# Patient Record
Sex: MALE | Race: WHITE | URBAN - METROPOLITAN AREA
[De-identification: names, ages, dates, MRNs, and addresses within clinical notes are randomized per-mention and may not be internally consistent; named-entity substitution may affect disease eponyms.]

---

## 2023-05-15 ENCOUNTER — TELEPHONE (OUTPATIENT)
Dept: CARDIOLOGY CLINIC | Age: 58
End: 2023-05-15

## 2023-05-15 NOTE — TELEPHONE ENCOUNTER
Patient was referred to the [address] [place] [address] location with Dr. Teri Turcios. Patient has been scheduled for 06/22 at 7:45am (am/pm). Patient verbalized understanding of appointment instructions. Call complete.      NOTE: Per Pt he will be dropping off his medical records for [facility]

## 2023-06-20 PROBLEM — I48.92 ATRIAL FLUTTER (HCC): Status: ACTIVE | Noted: 2019-03-11

## 2023-06-20 PROBLEM — R55 SYNCOPE: Status: ACTIVE | Noted: 2019-03-11

## 2023-06-20 NOTE — PROGRESS NOTES
drinks as this may exacerbated ectopy and arrhythmia. - The patient is counseled to get regular exercise 3-5 times per week to control cardiovascular risk factors. All questions and concerns were addressed to the patient/family. Alternatives to my treatment were discussed. I have discussed the above stated plan and the patient verbalized understanding and agreed with the plan. Scribe attestation: This note was scribed in the presence of Varun Corona MD by Jalyn Woodruff RN    I, Dr. Varun Corona personally performed the services described in this documentation as scribed by RN in my presence, and it is both accurate and complete. NOTE: This report was transcribed using voice recognition software. Every effort was made to ensure accuracy, however, inadvertent computerized transcription errors may be present.      Varun Corona MD, [facility], [address] [place] [address]   Office: [phone]  Fax: [phone]

## 2023-06-22 ENCOUNTER — HOSPITAL ENCOUNTER (OUTPATIENT)
Age: 58
Discharge: HOME | End: 2023-06-22
Payer: MEDICAID

## 2023-06-22 ENCOUNTER — OFFICE VISIT (OUTPATIENT)
Dept: CARDIOLOGY CLINIC | Age: 58
End: 2023-06-22
Payer: MEDICAID

## 2023-06-22 VITALS
DIASTOLIC BLOOD PRESSURE: 120 MMHG | OXYGEN SATURATION: 96 % | SYSTOLIC BLOOD PRESSURE: 162 MMHG | WEIGHT: 180 LBS | BODY MASS INDEX: 23.1 KG/M2 | HEART RATE: 41 BPM | HEIGHT: 74 IN

## 2023-06-22 DIAGNOSIS — Z87.891 HX OF SMOKING: ICD-10-CM

## 2023-06-22 DIAGNOSIS — R07.9 CHEST PAIN, UNSPECIFIED TYPE: ICD-10-CM

## 2023-06-22 DIAGNOSIS — R55 SYNCOPE, UNSPECIFIED SYNCOPE TYPE: ICD-10-CM

## 2023-06-22 DIAGNOSIS — R06.02 SOB (SHORTNESS OF BREATH): ICD-10-CM

## 2023-06-22 DIAGNOSIS — L81.9 DISCOLORATION OF SKIN OF LOWER LEG: ICD-10-CM

## 2023-06-22 DIAGNOSIS — I48.92 ATRIAL FLUTTER, UNSPECIFIED TYPE (HCC): ICD-10-CM

## 2023-06-22 DIAGNOSIS — I48.19 PERSISTENT ATRIAL FIBRILLATION (HCC): Primary | ICD-10-CM

## 2023-06-22 LAB
ALBUMIN SERPL-MCNC: 4.9 G/DL (ref 3.4–5)
ALBUMIN/GLOB SERPL: 1.9 {RATIO} (ref 1.1–2.2)
ALP SERPL-CCNC: 99 U/L (ref 40–129)
ALT SERPL-CCNC: 22 U/L (ref 10–40)
ANION GAP SERPL CALCULATED.3IONS-SCNC: 10 MMOL/L (ref 3–16)
AST SERPL-CCNC: 22 U/L (ref 15–37)
BASOPHILS # BLD: 0 K/UL (ref 0–0.2)
BASOPHILS NFR BLD: 0.8 %
BILIRUB SERPL-MCNC: 1.2 MG/DL (ref 0–1)
BUN SERPL-MCNC: 10 MG/DL (ref 7–20)
CALCIUM SERPL-MCNC: 9.8 MG/DL (ref 8.3–10.6)
CHLORIDE SERPL-SCNC: 100 MMOL/L (ref 99–110)
CHOLEST SERPL-MCNC: 243 MG/DL (ref 0–199)
CO2 SERPL-SCNC: 26 MMOL/L (ref 21–32)
CREAT SERPL-MCNC: 1.1 MG/DL (ref 0.8–1.3)
DEPRECATED RDW RBC AUTO: 13.9 % (ref 12.4–15.4)
EOSINOPHIL # BLD: 0.2 K/UL (ref 0–0.6)
EOSINOPHIL NFR BLD: 2.8 %
GFR SERPLBLD CREATININE-BSD FMLA CKD-EPI: >60 ML/MIN/{1.73_M2}
GLUCOSE SERPL-MCNC: 105 MG/DL (ref 70–99)
HCT VFR BLD AUTO: 49.4 % (ref 40.5–52.5)
HDLC SERPL-MCNC: 91 MG/DL (ref 40–60)
HGB BLD-MCNC: 17.1 G/DL (ref 13.5–17.5)
LDLC SERPL CALC-MCNC: 139 MG/DL
LYMPHOCYTES # BLD: 1.9 K/UL (ref 1–5.1)
LYMPHOCYTES NFR BLD: 30.3 %
MCH RBC QN AUTO: 36.3 PG (ref 26–34)
MCHC RBC AUTO-ENTMCNC: 34.7 G/DL (ref 31–36)
MCV RBC AUTO: 104.6 FL (ref 80–100)
MONOCYTES # BLD: 0.6 K/UL (ref 0–1.3)
MONOCYTES NFR BLD: 9.7 %
NEUTROPHILS # BLD: 3.4 K/UL (ref 1.7–7.7)
NEUTROPHILS NFR BLD: 56.4 %
PLATELET # BLD AUTO: 223 K/UL (ref 135–450)
PMV BLD AUTO: 8.3 FL (ref 5–10.5)
POTASSIUM SERPL-SCNC: 4.6 MMOL/L (ref 3.5–5.1)
PROT SERPL-MCNC: 7.5 G/DL (ref 6.4–8.2)
RBC # BLD AUTO: 4.72 M/UL (ref 4.2–5.9)
SODIUM SERPL-SCNC: 136 MMOL/L (ref 136–145)
T4 FREE SERPL-MCNC: 1.5 NG/DL (ref 0.9–1.8)
TRIGL SERPL-MCNC: 64 MG/DL (ref 0–150)
TSH SERPL DL<=0.005 MIU/L-ACNC: 5.48 UIU/ML (ref 0.27–4.2)
VLDLC SERPL CALC-MCNC: 13 MG/DL
WBC # BLD AUTO: 6.1 K/UL (ref 4–11)

## 2023-06-22 PROCEDURE — 80053 COMPREHEN METABOLIC PANEL: CPT

## 2023-06-22 PROCEDURE — 99204 OFFICE O/P NEW MOD 45 MIN: CPT | Performed by: INTERNAL MEDICINE

## 2023-06-22 PROCEDURE — 93000 ELECTROCARDIOGRAM COMPLETE: CPT | Performed by: INTERNAL MEDICINE

## 2023-06-22 PROCEDURE — 85025 COMPLETE CBC W/AUTO DIFF WBC: CPT

## 2023-06-22 PROCEDURE — 84439 ASSAY OF FREE THYROXINE: CPT

## 2023-06-22 PROCEDURE — 84443 ASSAY THYROID STIM HORMONE: CPT

## 2023-06-22 PROCEDURE — 80061 LIPID PANEL: CPT

## 2023-06-22 PROCEDURE — 36415 COLL VENOUS BLD VENIPUNCTURE: CPT

## 2023-06-22 RX ORDER — METOPROLOL SUCCINATE 25 MG/1
25 TABLET, EXTENDED RELEASE ORAL DAILY
Qty: 30 TABLET | Refills: 3 | Status: SHIPPED | OUTPATIENT
Start: 2023-06-22

## 2023-06-22 RX ORDER — ALBUTEROL SULFATE 90 UG/1
2 AEROSOL, METERED RESPIRATORY (INHALATION) EVERY 6 HOURS PRN
COMMUNITY

## 2023-06-22 RX ORDER — VALSARTAN 80 MG/1
80 TABLET ORAL DAILY
Qty: 90 TABLET | Refills: 1 | Status: SHIPPED | OUTPATIENT
Start: 2023-06-22

## 2023-06-27 ENCOUNTER — TELEPHONE (OUTPATIENT)
Dept: CARDIOLOGY CLINIC | Age: 58
End: 2023-06-27

## 2023-07-13 ENCOUNTER — PROCEDURE VISIT (OUTPATIENT)
Dept: CARDIOLOGY CLINIC | Age: 58
End: 2023-07-13

## 2023-07-13 DIAGNOSIS — I48.92 ATRIAL FLUTTER, UNSPECIFIED TYPE (HCC): ICD-10-CM

## 2023-07-13 DIAGNOSIS — R55 SYNCOPE, UNSPECIFIED SYNCOPE TYPE: ICD-10-CM

## 2023-07-13 DIAGNOSIS — R06.02 SOB (SHORTNESS OF BREATH): ICD-10-CM

## 2023-07-13 DIAGNOSIS — Z87.891 HX OF SMOKING: ICD-10-CM

## 2023-07-13 DIAGNOSIS — L81.9 DISCOLORATION OF SKIN OF LOWER LEG: ICD-10-CM

## 2023-07-13 DIAGNOSIS — R07.9 CHEST PAIN, UNSPECIFIED TYPE: ICD-10-CM

## 2023-07-13 LAB
LV EF: 38 %
LVEF MODALITY: NORMAL

## 2023-07-17 ENCOUNTER — TELEPHONE (OUTPATIENT)
Dept: CARDIOLOGY CLINIC | Age: 58
End: 2023-07-17

## 2023-07-17 NOTE — TELEPHONE ENCOUNTER
Tried to reach patient, LMOM to call the office to schedule a new patient  30 min appt with NPKV this week. Asked patient to return our call.

## 2023-07-17 NOTE — TELEPHONE ENCOUNTER
----- Message from Miriam Smith MD sent at 7/14/2023  6:33 PM EDT -----  Please refer to heart failure as soon as possible due to ongoing shortness of breath and severe LV dysfunction.

## 2023-07-20 NOTE — TELEPHONE ENCOUNTER
Pt is scheduled w/NPKV on 08/10. Pt is requesting for someone from the office call him to discuss the reason for the appt. Please advise.   Thank you

## 2023-07-20 NOTE — TELEPHONE ENCOUNTER
Patient called into the office and narinder is holding a 30 minute slot on  8/10/2023 and waiting on patient to call back

## 2023-08-03 ENCOUNTER — TELEPHONE (OUTPATIENT)
Dept: CARDIOLOGY CLINIC | Age: 58
End: 2023-08-03

## 2023-08-03 NOTE — TELEPHONE ENCOUNTER
[facility] she stated as long as patient is feeling well he can reschedule.

## 2023-08-03 NOTE — TELEPHONE ENCOUNTER
Pt updated his insurance to [employer] but it will not begin until 09/01. Pt has an appointment to get established NPKV on 08/10 with no insurance. Should pt reschedule, please advise.

## 2023-08-30 NOTE — PROGRESS NOTES
[facility]   Congestive Heart Failure    PrimaryCare Doctor:  None None  Primary Cardiologist: Brenda Kimble     Last/Next OVRonny Maywood      Chief Complaint:  sob    History of Present Illness:  Lou Lowry is a 59 y.o. male with PMH AFl, alcohol and nicotine abuse, marijuana and syncope who presents today with c/o SOB for the past 4 weeks. At his initial OV in June, ST and echo ordered, BB and ARB started. Echo showed low EF, ST not done yet and pt has not had f/u since then due to insurance coverage ([facility] However he has that changed and has all of his testing scheduled, has been taking meds and BP is improved. He c/o increased dyspnea, wt gain, abd distension, fatigue, and dizziness but denies chest pain, palpitations, orthopnea, PND, exertional chest pressure/discomfort, early saiety, edema, syncope. Wt is 6lb up on our scale    ER Visit: No  Recent Hospitalization: No    Baseline Weight: 180lb? Wt Readings from Last 3 Encounters:   09/05/23 186 lb (84.4 kg)   06/22/23 180 lb (81.6 kg)          EF: 35-40%  Cardiac Imaging:Echo 7/13/23   Summary   -Left ventricular cavity size is normal with normal left ventricular wall   thickness.   -Overall left ventricular systolic function is difficult to well evaluate   secondary to irregular heart rate, appears mildly reduced.   -Ejection fraction is visually estimated to be 35-40%. There is diffuse   hypokinesis.   -Indeterminate diastolic function due to irregular heart rhythm.   -Trivial mitral regurgitation.   -Mild tricuspid regurgitation.   -Estimated pulmonary artery systolic pressure is normal at 28 mmHg assuming   a right atrial pressure of 3 mmHg. -The left atrium is mildly dilated. -Right ventricular borderline function. TAPSE 1.4 cm. RV S Velocity 9.86   cm/s. Device: No        Activity: below baseline  Can you walk 1-2 blocks or do a moderate amount of house/yard work? Yes      NYHA Class: III       Sodium Restrictions: 3g  Fluid

## 2023-09-05 ENCOUNTER — OFFICE VISIT (OUTPATIENT)
Dept: CARDIOLOGY CLINIC | Age: 58
End: 2023-09-05
Payer: COMMERCIAL

## 2023-09-05 VITALS
HEART RATE: 78 BPM | DIASTOLIC BLOOD PRESSURE: 70 MMHG | OXYGEN SATURATION: 98 % | BODY MASS INDEX: 23.87 KG/M2 | HEIGHT: 74 IN | SYSTOLIC BLOOD PRESSURE: 112 MMHG | WEIGHT: 186 LBS

## 2023-09-05 DIAGNOSIS — R06.02 SOB (SHORTNESS OF BREATH): ICD-10-CM

## 2023-09-05 DIAGNOSIS — I50.21 ACUTE SYSTOLIC CONGESTIVE HEART FAILURE (HCC): Primary | ICD-10-CM

## 2023-09-05 DIAGNOSIS — I42.6 ALCOHOLIC CARDIOMYOPATHY (HCC): ICD-10-CM

## 2023-09-05 DIAGNOSIS — I48.92 ATRIAL FLUTTER, UNSPECIFIED TYPE (HCC): ICD-10-CM

## 2023-09-05 PROCEDURE — 99214 OFFICE O/P EST MOD 30 MIN: CPT | Performed by: NURSE PRACTITIONER

## 2023-09-05 PROCEDURE — 3017F COLORECTAL CA SCREEN DOC REV: CPT | Performed by: NURSE PRACTITIONER

## 2023-09-05 PROCEDURE — G8420 CALC BMI NORM PARAMETERS: HCPCS | Performed by: NURSE PRACTITIONER

## 2023-09-05 PROCEDURE — G8427 DOCREV CUR MEDS BY ELIG CLIN: HCPCS | Performed by: NURSE PRACTITIONER

## 2023-09-05 PROCEDURE — 1036F TOBACCO NON-USER: CPT | Performed by: NURSE PRACTITIONER

## 2023-09-05 RX ORDER — ASPIRIN 81 MG/1
81 TABLET ORAL DAILY
Qty: 90 TABLET | Refills: 1 | Status: SHIPPED | OUTPATIENT
Start: 2023-09-05

## 2023-09-05 RX ORDER — SPIRONOLACTONE 25 MG/1
12.5 TABLET ORAL DAILY
Qty: 30 TABLET | Refills: 3 | Status: SHIPPED | OUTPATIENT
Start: 2023-09-05

## 2023-09-05 ASSESSMENT — ENCOUNTER SYMPTOMS
SHORTNESS OF BREATH: 1
ABDOMINAL DISTENTION: 1

## 2023-09-05 NOTE — PATIENT INSTRUCTIONS
Instructions:   Medications: start spironolactone 1/2 pill once a day and jardiance once pill once a day, weigh and call Marilyn Tinsley by Friday if no wt loss or you don't feel better  Labs: next week   Lifestyle Recommendations: Weigh yourself every day in the morning after urination, call Marilyn Tinsley if wt increases 2-3lb in one day or 5lb in one week, Limit sodium to 3000mg/day and fluids to 2L or 64oz/day.    Follow up: jaimie in 6 weeks        [address]: [phone]

## 2023-09-06 ENCOUNTER — TELEPHONE (OUTPATIENT)
Dept: CARDIOLOGY CLINIC | Age: 58
End: 2023-09-06

## 2023-09-06 NOTE — TELEPHONE ENCOUNTER
Pt states he had an episode of lightheadedness to point of almost passing out. He had sweating his BP 91/57 . Pt states it lasted about 1 hr 15 min. States he currently he feels like he's racing. Pt states today is the 2nd of taking new meds spironolactone and Jardiance.

## 2023-09-08 ENCOUNTER — HOSPITAL ENCOUNTER (OUTPATIENT)
Dept: PULMONOLOGY | Age: 58
Discharge: HOME | End: 2023-09-08
Attending: INTERNAL MEDICINE
Payer: COMMERCIAL

## 2023-09-08 VITALS — OXYGEN SATURATION: 98 % | HEART RATE: 78 BPM | RESPIRATION RATE: 16 BRPM

## 2023-09-08 DIAGNOSIS — R55 SYNCOPE, UNSPECIFIED SYNCOPE TYPE: ICD-10-CM

## 2023-09-08 DIAGNOSIS — R07.9 CHEST PAIN, UNSPECIFIED TYPE: ICD-10-CM

## 2023-09-08 DIAGNOSIS — R06.02 SOB (SHORTNESS OF BREATH): ICD-10-CM

## 2023-09-08 DIAGNOSIS — Z87.891 HX OF SMOKING: ICD-10-CM

## 2023-09-08 DIAGNOSIS — I48.92 ATRIAL FLUTTER, UNSPECIFIED TYPE (HCC): ICD-10-CM

## 2023-09-08 DIAGNOSIS — L81.9 DISCOLORATION OF SKIN OF LOWER LEG: ICD-10-CM

## 2023-09-08 LAB
DLCO %PRED: 69 %
DLCO PRED: NORMAL
DLCO/VA %PRED: NORMAL
DLCO/VA PRED: NORMAL
DLCO/VA: NORMAL
DLCO: NORMAL
EXPIRATORY TIME-POST: NORMAL
EXPIRATORY TIME: NORMAL
FEF 25-75% %CHNG: NORMAL
FEF 25-75% %PRED-POST: NORMAL
FEF 25-75% %PRED-PRE: NORMAL
FEF 25-75% PRED: NORMAL
FEF 25-75%-POST: NORMAL
FEF 25-75%-PRE: NORMAL
FEV1 %PRED-POST: 76 %
FEV1 %PRED-PRE: 58 %
FEV1 PRED: NORMAL
FEV1-POST: NORMAL
FEV1-PRE: NORMAL
FEV1/FVC %PRED-POST: NORMAL
FEV1/FVC %PRED-PRE: NORMAL
FEV1/FVC PRED: NORMAL
FEV1/FVC-POST: 62 %
FEV1/FVC-PRE: 59 %
FVC %PRED-POST: NORMAL
FVC %PRED-PRE: NORMAL
FVC PRED: NORMAL
FVC-POST: NORMAL
FVC-PRE: NORMAL
GAW %PRED: NORMAL
GAW PRED: NORMAL
GAW: NORMAL
IC %PRED: NORMAL
IC PRED: NORMAL
IC: NORMAL
MEP: NORMAL
MIP: NORMAL
MVV %PRED-PRE: NORMAL
MVV PRED: NORMAL
MVV-PRE: NORMAL
PEF %PRED-POST: NORMAL
PEF %PRED-PRE: NORMAL
PEF PRED: NORMAL
PEF%CHNG: NORMAL
PEF-POST: NORMAL
PEF-PRE: NORMAL
RAW %PRED: NORMAL
RAW PRED: NORMAL
RAW: NORMAL
RV %PRED: NORMAL
RV PRED: NORMAL
RV: NORMAL
SVC %PRED: NORMAL
SVC PRED: NORMAL
SVC: NORMAL
TLC %PRED: 128 %
TLC PRED: NORMAL
TLC: NORMAL
VA %PRED: NORMAL
VA PRED: NORMAL
VA: NORMAL
VTG %PRED: NORMAL
VTG PRED: NORMAL
VTG: NORMAL

## 2023-09-08 PROCEDURE — 6370000000 HC RX 637 (ALT 250 FOR IP): Performed by: INTERNAL MEDICINE

## 2023-09-08 PROCEDURE — 94729 DIFFUSING CAPACITY: CPT

## 2023-09-08 PROCEDURE — 94760 N-INVAS EAR/PLS OXIMETRY 1: CPT

## 2023-09-08 PROCEDURE — 94060 EVALUATION OF WHEEZING: CPT

## 2023-09-08 PROCEDURE — 94726 PLETHYSMOGRAPHY LUNG VOLUMES: CPT

## 2023-09-08 RX ORDER — ALBUTEROL SULFATE 90 UG/1
4 AEROSOL, METERED RESPIRATORY (INHALATION) ONCE
Status: COMPLETED | OUTPATIENT
Start: 2023-09-08 | End: 2023-09-08

## 2023-09-08 RX ADMIN — Medication 4 PUFF: at 09:04

## 2023-09-08 ASSESSMENT — PULMONARY FUNCTION TESTS
FEV1/FVC_PRE: 59
FEV1_PERCENT_PREDICTED_POST: 76
FEV1_PERCENT_PREDICTED_PRE: 58
FEV1/FVC_POST: 62

## 2023-09-11 ENCOUNTER — HOSPITAL ENCOUNTER (OUTPATIENT)
Dept: NON INVASIVE DIAGNOSTICS | Age: 58
Discharge: HOME | End: 2023-09-11
Attending: INTERNAL MEDICINE
Payer: COMMERCIAL

## 2023-09-11 ENCOUNTER — TELEPHONE (OUTPATIENT)
Dept: CARDIOLOGY CLINIC | Age: 58
End: 2023-09-11

## 2023-09-11 DIAGNOSIS — R06.02 SOB (SHORTNESS OF BREATH): ICD-10-CM

## 2023-09-11 DIAGNOSIS — I48.92 ATRIAL FLUTTER, UNSPECIFIED TYPE (HCC): ICD-10-CM

## 2023-09-11 DIAGNOSIS — R07.9 CHEST PAIN, UNSPECIFIED TYPE: ICD-10-CM

## 2023-09-11 DIAGNOSIS — Z87.891 HX OF SMOKING: ICD-10-CM

## 2023-09-11 DIAGNOSIS — R55 SYNCOPE, UNSPECIFIED SYNCOPE TYPE: ICD-10-CM

## 2023-09-11 DIAGNOSIS — L81.9 DISCOLORATION OF SKIN OF LOWER LEG: ICD-10-CM

## 2023-09-11 PROCEDURE — 3430000000 HC RX DIAGNOSTIC RADIOPHARMACEUTICAL: Performed by: INTERNAL MEDICINE

## 2023-09-11 PROCEDURE — A9502 TC99M TETROFOSMIN: HCPCS | Performed by: INTERNAL MEDICINE

## 2023-09-11 RX ADMIN — TETROFOSMIN 10 MILLICURIE: 1.38 INJECTION, POWDER, LYOPHILIZED, FOR SOLUTION INTRAVENOUS at 09:04

## 2023-09-11 NOTE — TELEPHONE ENCOUNTER
Pt went for a myoview this morning 9/11 and they could not complete because his heart rate was too high. He was advised that he would need medication to lower his heart rate before going for test again. Please call to discuss.

## 2023-09-11 NOTE — PROGRESS NOTES
Pt here for stress testing. Baseline EKG showing Afib rate 110-128, UCVR. /95. O2 sat 99%. Pt states to feel sob but states to feel always sob. Dr. Mitul Zendejas made aware of afib  and EKG  reviewed. States to cancel stress for today and reschedule when rate is better controlled. Pt did take Toprol today prior to testing. Pt given update and will f/u with Dr. All Conde for further recommendations and follow up care.

## 2023-09-11 NOTE — TELEPHONE ENCOUNTER
Sandrine John is asking if [facility] still wants him to keep the Vascular MEHUL scheduled on 9-13? Please call to advise.

## 2023-09-13 ENCOUNTER — HOSPITAL ENCOUNTER (OUTPATIENT)
Dept: VASCULAR LAB | Age: 58
Discharge: HOME | End: 2023-09-13
Attending: INTERNAL MEDICINE
Payer: COMMERCIAL

## 2023-09-13 PROCEDURE — 93922 UPR/L XTREMITY ART 2 LEVELS: CPT

## 2023-09-14 ENCOUNTER — TELEPHONE (OUTPATIENT)
Dept: CARDIOLOGY CLINIC | Age: 58
End: 2023-09-14

## 2023-09-14 NOTE — TELEPHONE ENCOUNTER
[facility] Pulmonary, Sleep and [address], MD   [facility] [place] [facility], [address]   Pilar Childress, [address] [facility]   Phone: [phone]    Referral placed 6/2023.  Please have him schedule follow up since he had a pulmonary function test he needs follow up

## 2023-09-14 NOTE — TELEPHONE ENCOUNTER
Pt advised, pt also requests a nurse to call him with directions for his procedure tomorrow @11am, please advise.

## 2023-09-14 NOTE — TELEPHONE ENCOUNTER
Call placed to [facility] and discussed his PFT results as well as instructions for his DCCV.  He VU

## 2023-09-15 ENCOUNTER — TELEPHONE (OUTPATIENT)
Dept: CARDIOLOGY CLINIC | Age: 58
End: 2023-09-15

## 2023-09-15 ENCOUNTER — HOSPITAL ENCOUNTER (OUTPATIENT)
Dept: CARDIAC CATH/INVASIVE PROCEDURES | Age: 58
Discharge: HOME | End: 2023-09-15
Attending: INTERNAL MEDICINE | Admitting: INTERNAL MEDICINE
Payer: COMMERCIAL

## 2023-09-15 VITALS
BODY MASS INDEX: 23.87 KG/M2 | OXYGEN SATURATION: 99 % | TEMPERATURE: 98 F | SYSTOLIC BLOOD PRESSURE: 102 MMHG | WEIGHT: 186 LBS | HEIGHT: 74 IN | RESPIRATION RATE: 14 BRPM | HEART RATE: 72 BPM | DIASTOLIC BLOOD PRESSURE: 53 MMHG

## 2023-09-15 DIAGNOSIS — R06.02 SOB (SHORTNESS OF BREATH): Primary | ICD-10-CM

## 2023-09-15 LAB
EKG ATRIAL RATE: 100 BPM
EKG ATRIAL RATE: 105 BPM
EKG DIAGNOSIS: NORMAL
EKG DIAGNOSIS: NORMAL
EKG P AXIS: 100 DEGREES
EKG P AXIS: 78 DEGREES
EKG P-R INTERVAL: 140 MS
EKG P-R INTERVAL: 240 MS
EKG Q-T INTERVAL: 350 MS
EKG Q-T INTERVAL: 378 MS
EKG QRS DURATION: 100 MS
EKG QRS DURATION: 96 MS
EKG QTC CALCULATION (BAZETT): 462 MS
EKG QTC CALCULATION (BAZETT): 487 MS
EKG R AXIS: 81 DEGREES
EKG R AXIS: 89 DEGREES
EKG T AXIS: 67 DEGREES
EKG T AXIS: 76 DEGREES
EKG VENTRICULAR RATE: 100 BPM
EKG VENTRICULAR RATE: 105 BPM

## 2023-09-15 PROCEDURE — 92960 CARDIOVERSION ELECTRIC EXT: CPT | Performed by: INTERNAL MEDICINE

## 2023-09-15 PROCEDURE — 7100000010 HC PHASE II RECOVERY - FIRST 15 MIN

## 2023-09-15 PROCEDURE — 92960 CARDIOVERSION ELECTRIC EXT: CPT

## 2023-09-15 PROCEDURE — 2500000003 HC RX 250 WO HCPCS

## 2023-09-15 PROCEDURE — 93005 ELECTROCARDIOGRAM TRACING: CPT

## 2023-09-15 PROCEDURE — 99152 MOD SED SAME PHYS/QHP 5/>YRS: CPT | Performed by: INTERNAL MEDICINE

## 2023-09-15 PROCEDURE — 93010 ELECTROCARDIOGRAM REPORT: CPT | Performed by: INTERNAL MEDICINE

## 2023-09-15 RX ORDER — SODIUM CHLORIDE 0.9 % (FLUSH) 0.9 %
5-40 SYRINGE (ML) INJECTION PRN
Status: DISCONTINUED | OUTPATIENT
Start: 2023-09-15 | End: 2023-09-15 | Stop reason: HOSPADM

## 2023-09-15 NOTE — PROCEDURES
[facility]     Electrophysiology Procedure Note       Date of Procedure: 9/15/2023  Patient's Name: Alice Marina  YOB: 1965   Medical Record Number: [id number]  Procedure Performed by: Citlaly Black MD    Procedures performed:  IV sedation. External Electrical cardioversion   Mallampati3  ASA 3    Indication of the procedure: Persistent atrial fibrillation      Details of procedure: The patient was brought to the cath lab area in a fasting and non-sedated state. The risks, benefits and alternatives of the procedure were discussed with the patient. The patient opted to proceed with the procedure. Written informed consent was signed and placed in the chart. A timeout protocol was completed to identify the patient and the procedure being performed. I pushed 55 mg Brevital.   We monitored the patient's level of consciousness and vital signs/physiologic status throughout the procedure duration (see start and stop times below). Sedation:     Sedation start: 1145  Sedation stop: 1200     Patient is on chronic anticoagulation therapy. Then we used Brevital for sedation and electrical DC cardioversion was perfomred using 200J, synchronized shock. Patient was converted to sinus rhythm at 71 bpm. The patient tolerated the procedure well and there were no complications. Conclusion:   Successful external DC cardioversion of atrial fibrillation . Plan:   The patient can be discharged if remains stable. Will continue with medical therapy.      Continue guideline directed medical therapy  3-month follow-up  Pulmonary referral for abnormal PFT  Reschedule stress

## 2023-09-15 NOTE — TELEPHONE ENCOUNTER
Pt had cardoversion today and is now experiencing pain under the thumb on the hand the iv was in. Transferred to [facility].

## 2023-09-15 NOTE — TELEPHONE ENCOUNTER
[facility] has scheduled pt for NM Cardiac Stress Test Nuclear Imaging for 9/22. Since prior test was not completed they will need new order. Please place in [facility].

## 2023-09-15 NOTE — TELEPHONE ENCOUNTER
Spoke to PT, he stated the hand the IV was in feels exteremly sore and swollen with the veins looking popped out. PT is wondering if this is a normal occurrence after procedure. Please advise.

## 2023-09-15 NOTE — PROGRESS NOTES
CATH LAB PROCEDURE LOG - CARDIOVERSION      PRE PROCEDURE    DATE: 9/15/2023 ARRIVAL TO CATH LAB: 11:00 AM    ID & ALLERGY BAND: On    CONSENT: Yes    NPO SINCE: Midnight    IV SITE: Started in left arm. Pt arrived to Cath Lab. Plan of Care: Hemodynamics and cardiac rhythm will remain stable. Comfort level will be maintained. Respiratory function will remain adequate. Pt/family will verbalize understanding of the procedure. Procedure will be tolerated without complications. Patient will recover from procedure without complications. ID armband on patient and identification verified. Informed consent obtained. Non invasive blood pressure cuff applied, monitoring initiated. EKG pads and pulse oximeter applied, monitoring initiated. Instructions given. Patient and / or family verbalize understanding. H&P will be documented by physician in [address] [place] [address]. Pt has been NPO since midnight. Post DCCV EKG ordered? Yes    Pre CV Rhythm: Atrial Flutter      CARDIOVERSION    Timeout and fire safety completed. TIMEOUT TIME: 11:19 AM    CORRECT PATIENT VERIFIED. MEMBERS OF THE SURGICAL TEAM/VISITORS INTRODUCED. ALLERGIES ANNOUNCED. CORRECT PROCEDURE VERIFIED. CORRECT PROCEDURAL SITE VERIFIED. CONSENTS VERIFIED. IMPLANT EQUIPMENT, ADDITIONAL SERVICES, SPECIAL REQUIREMENTS AVAILABLE. MEDICATIONS LABELED AND AVAILABLE. APPROPRIATE PRE MEDS HAVE BEEN ADMINISTERED. FIRE SAFETY: ALCOHOL PREP SOLUTION HAD SUFFICIENT TIME TO DISSIPATE IF USED. FIRE SAFETY: SURGICAL SITE OR INCISION ABOVE THE XIPHOID. YES=1, NO=0. FIRE SAFETY: OPEN OXYGEN SOURCE. YES=1, NO=0. FIRE SAFETY: AVAILABLE IGNITION SOURCE. YES=1, NO=0. FIRE SAFETY: SCORE TOTAL = 1.      Procedure Medication:     11:19 AM - Brevital 50 mg IV given by Dr Shyanne Kyle performed at 200 joules      Rhythm was converted to Normal Sinus Rhythm    11:22 AM Pt waking up, respirations spontaneous, able to converse appropriately, follows commands, moves all extremities bilaterally, resting quietly. POST PROCEDURE    DISCHARGE TIME: {currenttime:06218} PIV removed at time of discharge, catheter intact, and no site complications noted.      {[facility] Dc to floor or home:72507}

## 2023-09-15 NOTE — TELEPHONE ENCOUNTER
Please call patient and let then him know his appt from 09/28 has been moved 01/03.   Please provide him with the number to reschedule his cancelled stress test.

## 2023-09-15 NOTE — H&P
[facility]   Electrophysiology C   Reason for Consultation: Atrial flutter   Consult Requesting Physician: self   Chief Complaint   Patient presents with    Chest Pain     Wakes up with anxiety and has SOB. Shortness of Breath    Dizziness    New Patient       CC: dizziness   HPI: Deidre Urbina is a 59 y.o. male with PMH of atrial flutter, alcohol abuse, nicotine abuse, marijuana usage and syncope    3/11/2019 had an episode of atrial flutter while ill with flu. Underwent DCCV at [facility]. Underwent DCCV. Had follow up with cardiology 4/22/2019. Was referred to EP for evaluation of possible ablation      Interval History:  Lito Machuca presents to the office as a new patient. He has complaints of worsening palpitations, fatigues, blue/purple foot and fatigue. He denies drug usage. Quit smoking 12/2022. He drinks a 6 pack of beer every other night. Assessment and plan:   Persistent Afib/Atrial flutter   -ECG today shows atrial fibrillation   -Patient has a [id number] Score of 1 (HTN)    - [id number] Vasc score and anticoagulation discussed. High risk for stroke and thromboembolism. Anticoagulation is recommended. I discussed anticoagulation to decrease the risk of thromboembolic events including stroke. Benefits and alternatives were discussed with patient. Risk of bleeding was discussed. Patient verbalized understanding.    -Afib risk factors including age, HTN, obesity, inactivity and HOMERO were discussed with patient. Risk factor modification recommended. All questions were answered. -Treatment options including cardioversion, rate control strategy, antiarrhythmics, anticoagulation and possible ablation were discussed with patient. Risks, benefits and alternative of each treatment options were explained.  All questions answered   -Will start toprol XL 25 mg daily and eliquis   -Complete a DCCV in 3-4 weeks of full anticoagulation    Chest pain/Shortness of breath   -Orders placed for

## 2023-09-22 ENCOUNTER — TELEPHONE (OUTPATIENT)
Dept: CARDIOLOGY CLINIC | Age: 58
End: 2023-09-22

## 2023-09-22 ENCOUNTER — HOSPITAL ENCOUNTER (OUTPATIENT)
Dept: NON INVASIVE DIAGNOSTICS | Age: 58
Discharge: HOME | End: 2023-09-22
Payer: COMMERCIAL

## 2023-09-22 DIAGNOSIS — R94.39 ABNORMAL NUCLEAR STRESS TEST: Primary | ICD-10-CM

## 2023-09-22 PROCEDURE — 3430000000 HC RX DIAGNOSTIC RADIOPHARMACEUTICAL: Performed by: INTERNAL MEDICINE

## 2023-09-22 PROCEDURE — 78452 HT MUSCLE IMAGE SPECT MULT: CPT | Performed by: INTERNAL MEDICINE

## 2023-09-22 PROCEDURE — 93017 CV STRESS TEST TRACING ONLY: CPT | Performed by: INTERNAL MEDICINE

## 2023-09-22 PROCEDURE — A9502 TC99M TETROFOSMIN: HCPCS | Performed by: INTERNAL MEDICINE

## 2023-09-22 RX ADMIN — TETROFOSMIN 30 MILLICURIE: 1.38 INJECTION, POWDER, LYOPHILIZED, FOR SOLUTION INTRAVENOUS at 13:50

## 2023-09-22 RX ADMIN — TETROFOSMIN 10 MILLICURIE: 1.38 INJECTION, POWDER, LYOPHILIZED, FOR SOLUTION INTRAVENOUS at 12:54

## 2023-09-22 NOTE — PROGRESS NOTES
Patient instructed on Bryan Protocol Stress Test Procedure including possible side effects and adverse reactions. Verbalizes knowledge and understanding and denies having any questions.
English

## 2023-09-25 ENCOUNTER — OFFICE VISIT (OUTPATIENT)
Dept: PULMONOLOGY | Age: 58
End: 2023-09-25
Payer: COMMERCIAL

## 2023-09-25 VITALS — OXYGEN SATURATION: 94 % | HEART RATE: 74 BPM | WEIGHT: 189 LBS | BODY MASS INDEX: 24.27 KG/M2

## 2023-09-25 DIAGNOSIS — Z87.891 PERSONAL HISTORY OF TOBACCO USE: Primary | ICD-10-CM

## 2023-09-25 DIAGNOSIS — I48.92 ATRIAL FLUTTER, UNSPECIFIED TYPE (HCC): ICD-10-CM

## 2023-09-25 DIAGNOSIS — J43.2 CENTRILOBULAR EMPHYSEMA (HCC): ICD-10-CM

## 2023-09-25 DIAGNOSIS — J44.9 COPD, MODERATE (HCC): ICD-10-CM

## 2023-09-25 DIAGNOSIS — J96.11 CHRONIC HYPOXEMIC RESPIRATORY FAILURE (HCC): ICD-10-CM

## 2023-09-25 PROCEDURE — 99204 OFFICE O/P NEW MOD 45 MIN: CPT | Performed by: INTERNAL MEDICINE

## 2023-09-25 PROCEDURE — G8420 CALC BMI NORM PARAMETERS: HCPCS | Performed by: INTERNAL MEDICINE

## 2023-09-25 PROCEDURE — G0296 VISIT TO DETERM LDCT ELIG: HCPCS | Performed by: INTERNAL MEDICINE

## 2023-09-25 PROCEDURE — G8427 DOCREV CUR MEDS BY ELIG CLIN: HCPCS | Performed by: INTERNAL MEDICINE

## 2023-09-25 PROCEDURE — 3023F SPIROM DOC REV: CPT | Performed by: INTERNAL MEDICINE

## 2023-09-25 PROCEDURE — 3017F COLORECTAL CA SCREEN DOC REV: CPT | Performed by: INTERNAL MEDICINE

## 2023-09-25 PROCEDURE — 1036F TOBACCO NON-USER: CPT | Performed by: INTERNAL MEDICINE

## 2023-09-25 RX ORDER — ALBUTEROL SULFATE 90 UG/1
2 AEROSOL, METERED RESPIRATORY (INHALATION) EVERY 6 HOURS PRN
Qty: 18 G | Refills: 11 | Status: SHIPPED | OUTPATIENT
Start: 2023-09-25 | End: 2024-09-24

## 2023-09-25 RX ORDER — FLUTICASONE FUROATE, UMECLIDINIUM BROMIDE AND VILANTEROL TRIFENATATE 100; 62.5; 25 UG/1; UG/1; UG/1
1 POWDER RESPIRATORY (INHALATION) DAILY
Qty: 1 EACH | Refills: 5 | Status: SHIPPED | OUTPATIENT
Start: 2023-09-25

## 2023-09-25 NOTE — PROGRESS NOTES
Discussed with the patient the current USPSTF guidelines released March 9, 2021 for screening for lung cancer. For adults aged 48 to 80 years who have a 20 pack-year smoking history and currently smoke or have quit within the past 15 years the grade B recommendation is to:  Screen for lung cancer with low-dose computed tomography (LDCT) every year. Stop screening once a person has not smoked for 15 years or has a health problem that limits life expectancy or the ability to have lung surgery. The patient  reports that he quit smoking about 9 months ago. His smoking use included cigarettes. He has a 40.00 pack-year smoking history. He does not have any smokeless tobacco history on file. . Discussed with patient the risks and benefits of screening, including over-diagnosis, false positive rate, and total radiation exposure. The patient currently exhibits no signs or symptoms suggestive of lung cancer. Discussed with patient the importance of compliance with yearly annual lung cancer screenings and willingness to undergo diagnosis and treatment if screening scan is positive. In addition, the patient was counseled regarding the importance of remaining smoke free and/or total smoking cessation.     Also reviewed the following if the patient has Medicare that as of February 10, 2022, Medicare only covers LDCT screening in patients aged 53-69 with at least a 20 pack-year smoking history who currently smoke or have quit in the last 15 years
from Last 3 Encounters:   23 189 lb (85.7 kg)   09/15/23 186 lb (84.4 kg)   23 186 lb (84.4 kg)     BP Readings from Last 3 Encounters:   09/15/23 (!) 102/53   23 112/70   23 (!) 162/120        Social History     Tobacco Use   Smoking Status Former    Types: Cigarettes    Quit date: 2022    Years since quittin.7   Smokeless Tobacco Not on file

## 2023-09-27 NOTE — TELEPHONE ENCOUNTER
Date of Procedure: Friday 10/13/23 @ [facility] with Dr. Hamilton Lyons     Time of arrival: 6:45 am     Procedure time: 8:00 am     Spoke to Michael and he is agreeable to date and time. Reviewed instructions and he verbalized understanding. Encouraged to call with any questions or concerns. Can you please reach out to Michael and clarify his medications for his procedure. Published on Lindsey Shell, scheduled in Epic and e-mailed to cath lab.

## 2023-09-28 ENCOUNTER — TELEPHONE (OUTPATIENT)
Dept: PULMONOLOGY | Age: 58
End: 2023-09-28

## 2023-09-28 NOTE — TELEPHONE ENCOUNTER
Last OV 9/25/2023    Trelegy is not covered by patient's insurance.     They prefer Stiolto, Anoro, Advair, symbicort, dulera

## 2023-10-13 ENCOUNTER — HOSPITAL ENCOUNTER (OUTPATIENT)
Dept: CARDIAC CATH/INVASIVE PROCEDURES | Age: 58
Discharge: HOME | End: 2023-10-13
Attending: STUDENT IN AN ORGANIZED HEALTH CARE EDUCATION/TRAINING PROGRAM | Admitting: STUDENT IN AN ORGANIZED HEALTH CARE EDUCATION/TRAINING PROGRAM
Payer: COMMERCIAL

## 2023-10-13 VITALS
OXYGEN SATURATION: 98 % | WEIGHT: 189 LBS | RESPIRATION RATE: 18 BRPM | HEART RATE: 71 BPM | BODY MASS INDEX: 24.26 KG/M2 | TEMPERATURE: 97.7 F | DIASTOLIC BLOOD PRESSURE: 72 MMHG | HEIGHT: 74 IN | SYSTOLIC BLOOD PRESSURE: 122 MMHG

## 2023-10-13 DIAGNOSIS — I25.83 CORONARY ARTERY DISEASE DUE TO LIPID RICH PLAQUE: Primary | ICD-10-CM

## 2023-10-13 DIAGNOSIS — I25.10 CORONARY ARTERY DISEASE DUE TO LIPID RICH PLAQUE: Primary | ICD-10-CM

## 2023-10-13 LAB
ANION GAP SERPL CALCULATED.3IONS-SCNC: 12 MMOL/L (ref 3–16)
BUN SERPL-MCNC: 13 MG/DL (ref 7–20)
CALCIUM SERPL-MCNC: 9.7 MG/DL (ref 8.3–10.6)
CHLORIDE SERPL-SCNC: 106 MMOL/L (ref 99–110)
CO2 SERPL-SCNC: 21 MMOL/L (ref 21–32)
CREAT SERPL-MCNC: 1.2 MG/DL (ref 0.8–1.3)
DEPRECATED RDW RBC AUTO: 13.8 % (ref 12.4–15.4)
EKG ATRIAL RATE: 73 BPM
EKG DIAGNOSIS: NORMAL
EKG P AXIS: 74 DEGREES
EKG P-R INTERVAL: 130 MS
EKG Q-T INTERVAL: 390 MS
EKG QRS DURATION: 102 MS
EKG QTC CALCULATION (BAZETT): 429 MS
EKG R AXIS: 86 DEGREES
EKG T AXIS: 71 DEGREES
EKG VENTRICULAR RATE: 73 BPM
GFR SERPLBLD CREATININE-BSD FMLA CKD-EPI: >60 ML/MIN/{1.73_M2}
GLUCOSE SERPL-MCNC: 104 MG/DL (ref 70–99)
HCT VFR BLD AUTO: 44.2 % (ref 40.5–52.5)
HGB BLD-MCNC: 14.7 G/DL (ref 13.5–17.5)
MCH RBC QN AUTO: 35.1 PG (ref 26–34)
MCHC RBC AUTO-ENTMCNC: 33.2 G/DL (ref 31–36)
MCV RBC AUTO: 105.8 FL (ref 80–100)
PLATELET # BLD AUTO: 240 K/UL (ref 135–450)
PMV BLD AUTO: 7.4 FL (ref 5–10.5)
POC ACT LR: 168 SEC
POC ACT LR: 188 SEC
POTASSIUM SERPL-SCNC: 4.5 MMOL/L (ref 3.5–5.1)
RBC # BLD AUTO: 4.18 M/UL (ref 4.2–5.9)
SODIUM SERPL-SCNC: 139 MMOL/L (ref 136–145)
WBC # BLD AUTO: 5.1 K/UL (ref 4–11)

## 2023-10-13 PROCEDURE — 99152 MOD SED SAME PHYS/QHP 5/>YRS: CPT

## 2023-10-13 PROCEDURE — 36415 COLL VENOUS BLD VENIPUNCTURE: CPT

## 2023-10-13 PROCEDURE — C1725 CATH, TRANSLUMIN NON-LASER: HCPCS

## 2023-10-13 PROCEDURE — 6360000002 HC RX W HCPCS

## 2023-10-13 PROCEDURE — 99152 MOD SED SAME PHYS/QHP 5/>YRS: CPT | Performed by: STUDENT IN AN ORGANIZED HEALTH CARE EDUCATION/TRAINING PROGRAM

## 2023-10-13 PROCEDURE — 6360000002 HC RX W HCPCS: Performed by: STUDENT IN AN ORGANIZED HEALTH CARE EDUCATION/TRAINING PROGRAM

## 2023-10-13 PROCEDURE — C1753 CATH, INTRAVAS ULTRASOUND: HCPCS

## 2023-10-13 PROCEDURE — 93458 L HRT ARTERY/VENTRICLE ANGIO: CPT

## 2023-10-13 PROCEDURE — 2500000003 HC RX 250 WO HCPCS

## 2023-10-13 PROCEDURE — C1887 CATHETER, GUIDING: HCPCS

## 2023-10-13 PROCEDURE — 99153 MOD SED SAME PHYS/QHP EA: CPT

## 2023-10-13 PROCEDURE — 93458 L HRT ARTERY/VENTRICLE ANGIO: CPT | Performed by: STUDENT IN AN ORGANIZED HEALTH CARE EDUCATION/TRAINING PROGRAM

## 2023-10-13 PROCEDURE — 93005 ELECTROCARDIOGRAM TRACING: CPT | Performed by: STUDENT IN AN ORGANIZED HEALTH CARE EDUCATION/TRAINING PROGRAM

## 2023-10-13 PROCEDURE — 92928 PRQ TCAT PLMT NTRAC ST 1 LES: CPT

## 2023-10-13 PROCEDURE — 92978 ENDOLUMINL IVUS OCT C 1ST: CPT | Performed by: STUDENT IN AN ORGANIZED HEALTH CARE EDUCATION/TRAINING PROGRAM

## 2023-10-13 PROCEDURE — C1769 GUIDE WIRE: HCPCS

## 2023-10-13 PROCEDURE — 93010 ELECTROCARDIOGRAM REPORT: CPT | Performed by: INTERNAL MEDICINE

## 2023-10-13 PROCEDURE — 85347 COAGULATION TIME ACTIVATED: CPT

## 2023-10-13 PROCEDURE — 6370000000 HC RX 637 (ALT 250 FOR IP)

## 2023-10-13 PROCEDURE — 80048 BASIC METABOLIC PNL TOTAL CA: CPT

## 2023-10-13 PROCEDURE — C1894 INTRO/SHEATH, NON-LASER: HCPCS

## 2023-10-13 PROCEDURE — C1874 STENT, COATED/COV W/DEL SYS: HCPCS

## 2023-10-13 PROCEDURE — 92978 ENDOLUMINL IVUS OCT C 1ST: CPT

## 2023-10-13 PROCEDURE — 85027 COMPLETE CBC AUTOMATED: CPT

## 2023-10-13 PROCEDURE — 92928 PRQ TCAT PLMT NTRAC ST 1 LES: CPT | Performed by: STUDENT IN AN ORGANIZED HEALTH CARE EDUCATION/TRAINING PROGRAM

## 2023-10-13 PROCEDURE — 2709999900 HC NON-CHARGEABLE SUPPLY

## 2023-10-13 PROCEDURE — 6360000004 HC RX CONTRAST MEDICATION: Performed by: STUDENT IN AN ORGANIZED HEALTH CARE EDUCATION/TRAINING PROGRAM

## 2023-10-13 RX ORDER — ATORVASTATIN CALCIUM 40 MG/1
40 TABLET, FILM COATED ORAL NIGHTLY
Status: DISCONTINUED | OUTPATIENT
Start: 2023-10-13 | End: 2023-10-13 | Stop reason: HOSPADM

## 2023-10-13 RX ORDER — ACETAMINOPHEN 325 MG/1
650 TABLET ORAL EVERY 4 HOURS PRN
Status: DISCONTINUED | OUTPATIENT
Start: 2023-10-13 | End: 2023-10-13 | Stop reason: HOSPADM

## 2023-10-13 RX ORDER — CLOPIDOGREL BISULFATE 75 MG/1
75 TABLET ORAL DAILY
Qty: 90 TABLET | Refills: 1 | Status: SHIPPED | OUTPATIENT
Start: 2023-10-13

## 2023-10-13 RX ORDER — EPTIFIBATIDE 2 MG/ML
180 INJECTION, SOLUTION INTRAVENOUS ONCE
Status: COMPLETED | OUTPATIENT
Start: 2023-10-13 | End: 2023-10-13

## 2023-10-13 RX ORDER — SODIUM CHLORIDE 9 MG/ML
INJECTION, SOLUTION INTRAVENOUS PRN
Status: DISCONTINUED | OUTPATIENT
Start: 2023-10-13 | End: 2023-10-13 | Stop reason: HOSPADM

## 2023-10-13 RX ORDER — SODIUM CHLORIDE 0.9 % (FLUSH) 0.9 %
5-40 SYRINGE (ML) INJECTION EVERY 12 HOURS SCHEDULED
Status: DISCONTINUED | OUTPATIENT
Start: 2023-10-13 | End: 2023-10-13 | Stop reason: HOSPADM

## 2023-10-13 RX ORDER — SODIUM CHLORIDE 0.9 % (FLUSH) 0.9 %
5-40 SYRINGE (ML) INJECTION PRN
Status: DISCONTINUED | OUTPATIENT
Start: 2023-10-13 | End: 2023-10-13 | Stop reason: HOSPADM

## 2023-10-13 RX ADMIN — IOPAMIDOL 62 ML: 755 INJECTION, SOLUTION INTRAVENOUS at 09:14

## 2023-10-13 RX ADMIN — EPTIFIBATIDE 15420 MCG: 2 INJECTION INTRAVENOUS at 09:29

## 2023-10-13 NOTE — PROGRESS NOTES
Attempted to call patient x2    Discussed antiplatelet + DOAC regimen post PCI but wanted to ensure he had his plavix prescription filled  Repeat plavix prescription ordered    Sulema May MD

## 2023-10-13 NOTE — PROGRESS NOTES
PRE-PROCEDURE    DATE: 10/13/2023 ARRIVAL TO CATH LAB: 6:52 AM    ADMIT SOURCE: Outpatient    ID & ALLERGY BAND: On    CONSENT: Yes    NPO SINCE: Midnight    LABS/PREGNANCY TEST: N/A    PULSES:  Right Radial Artery 2+  Right DP 2+  Right PT 1+  Left DP 2+  Left PT 1+    IV SITE : Started in left arm.  with fluids infusing at kvo 6:52 AM     SURGERIES PLANNED: No    BLEEDING PROBLEMS: No    BLEEDING RISK: Low Risk <2%    COMPLIANCE: Yes      PATIENT HISTORY    CHIEF COMPLAINT: Chest Pain    EKG:  Yes    Pre CATH Rhythm: Normal Sinus Rhythm    STRESS TEST PREFORMED:  Yes FINDINGS:   Exercise Stress Test (without imaging): Date:  2/24/2014  Result: Negative     EF: 56    CARDIAC CTA PREFORMED:  No    AGATSTON CORONARY CALCIUM SCORE:   Assessed: No    ECHO: DATE:  Yes. Most recent date: 7/13/2023      EF:  30/40    HYPERTENSION: Yes    DYSLIPIDEMIA: Yes    FAMILY HX OF CAD: Yes    PRIOR MI: No    PRIOR PCI: No    PRIOR CABG: No    CEREBRALVASCULAR DX: No    PERIPHERAL ARTERIAL DISEASE: No    CHRONIC LUNG DISEASE: Yes    TOBACCO: Former.    Quit Date: 2022    DIABETIC:  No    CARDIAC ARREST: No    DIALYSIS: No    FRAILTY SCORE: 4 VULNERABLE (while not dependent on others for IADLs, symptoms limit activities)

## 2023-10-13 NOTE — PROCEDURES
Cardiac Catheterization Operative Report    Ceasar Head  [id number]  10/13/2023  None, None    Patient has a diagnosis of abnormal stress test. He was referred for cardiac catheterization to define coronary anatomy. Procedure Details  The risks, benefits, complications, treatment options, and expected outcomes were discussed with the patient. The patient and/or family concurred with the proposed plan, giving informed consent. Patient was brought to the cath lab after IV hydration was begun and oral premedication was given. He was further sedated with fentanyl and versed. He was prepped and draped in the usual manner. Using the modified Seldinger access technique, a 6 Vietnamese sheath was placed in the Right Radial artery. Heparin was administered. A  heart catheterization was done. Angiograms were also done. Moderate Sedation:  Start time: 0826  Stop time: 0902  5.5 mg versed   175 mcg fentanyl   An independent trained observer pushed medications at my direction. We monitored the patient's level of consciousness and vital signs/physiologic status throughout the procedure duration (see start and start times above). Contrast 62 mL  Fluoro time 6.9    Interventions:  IVUS guided PCI of the RCA    After the procedure was completed. sedation was stopped and the sheaths and catheters were all removed. Hemostasis was achieved with manual pressure.     Findings:    Hemodynamics  LVEDP 9 mmHg   Left Main  No angiographically significant disease   LAD  Minor luminal irregularities  D1: No angiographically significant disease   Circ  No angiographically significant disease  OM1: No angiographically significant disease   RCA  Proximal vessel ulcerated 80% stenosis, large dominant vessel  rPDA: No angiographically significant disease       Interventions/Vessels  IVUS guided PCI of the RCA    - IVUS performed distal vessel with positive remodeling 4.5mm proximal vessel reference 4.2mm, plaque does not required calcium modification, fibrofatty plaque with necrotic core  - Xience 4.0x18mm SHAQUILLE deployed at 17 george  - Post dilation performed with a 4.0mm NC balloon to 19 george  - Post IVUS performed with no edge dissections and excellent apposition with full expansion, MSA 9.08 mm2     Guides/Wires  JR4 Guide   Terumo RunThrough   Devices  Rector Opticross   Post % Stenosis  0   Closure Device  Radial hemostasis band   Complications  None     Conclusion:   - Patient ACT difficult to elevate throughout the case, additional IV placed and full 7000 IU bolus given post completion of case, will place on GPI for 4 hours with double bolus given in lab  - Cardiac rehab   - DAPT + eliquis x 1 month, followed by DOAC + Plavix for 12 months  - Start statin therapy  - Follow up in 2 weeks with me post procedure    Estimated Blood Loss:  Minimal         Complications:  None; patient tolerated the procedure well. Disposition: PACU - hemodynamically stable.            Condition: stable    Darius Sterling MD  Interventional Cardiology  10/13/2023  9:46 AM

## 2023-10-14 ENCOUNTER — TELEPHONE (OUTPATIENT)
Dept: CARDIOLOGY | Age: 58
End: 2023-10-14

## 2023-10-14 NOTE — PROGRESS NOTES
Spoke with Nayla Early and confirmed prescription for plavix     Instructed to take DOAC + Asa/plavix with plan to deescalate to [address] + plavix     Patient feels great today, no acute complaints

## 2023-10-15 DIAGNOSIS — I48.92 ATRIAL FLUTTER, UNSPECIFIED TYPE (HCC): ICD-10-CM

## 2023-10-15 DIAGNOSIS — Z87.891 HX OF SMOKING: ICD-10-CM

## 2023-10-15 DIAGNOSIS — R06.02 SOB (SHORTNESS OF BREATH): ICD-10-CM

## 2023-10-15 DIAGNOSIS — R07.9 CHEST PAIN, UNSPECIFIED TYPE: ICD-10-CM

## 2023-10-15 DIAGNOSIS — R55 SYNCOPE, UNSPECIFIED SYNCOPE TYPE: ICD-10-CM

## 2023-10-15 DIAGNOSIS — L81.9 DISCOLORATION OF SKIN OF LOWER LEG: ICD-10-CM

## 2023-10-15 NOTE — TELEPHONE ENCOUNTER
Last OV: 9/5/23 NPKV  Last labs: 10/13/23  Last EKG: 10/13/23  Appt scheduled : 10/17/23 NPKV            metoprolol succinate (TOPROL XL) 25 MG extended release tablet 30 tablet 3 6/22/2023     Sig - Route:  Take 1 tablet by mouth daily - Oral    Sent to pharmacy as: Metoprolol Succinate ER 25 MG Oral Tablet Extended Release 24 Hour (Toprol XL)    Cosign for Ordering: Accepted by Lucille Rutherford MD on 6/23/2023  3:53 PM    E-Prescribing Status: Receipt confirmed by pharmacy (6/22/2023  8:25 AM EDT)

## 2023-10-16 ENCOUNTER — TELEPHONE (OUTPATIENT)
Dept: CARDIOLOGY CLINIC | Age: 58
End: 2023-10-16

## 2023-10-16 ENCOUNTER — TELEPHONE (OUTPATIENT)
Dept: PULMONOLOGY | Age: 58
End: 2023-10-16

## 2023-10-16 RX ORDER — METOPROLOL SUCCINATE 25 MG/1
25 TABLET, EXTENDED RELEASE ORAL DAILY
Qty: 30 TABLET | Refills: 5 | Status: SHIPPED | OUTPATIENT
Start: 2023-10-16 | End: 2023-10-17

## 2023-10-16 NOTE — TELEPHONE ENCOUNTER
[facility] is not in network with [facility]  The order for oxygen will be sent to [facility]

## 2023-10-16 NOTE — PROGRESS NOTES
02/23/2014 10:15 AM     10/13/2023 07:00 AM     06/22/2023 09:15 AM     02/23/2014 10:15 AM    CO2 21 10/13/2023 07:00 AM    CO2 26 06/22/2023 09:15 AM    CO2 25 02/23/2014 10:15 AM    BUN 13 10/13/2023 07:00 AM    BUN 10 06/22/2023 09:15 AM    BUN 12 02/23/2014 10:15 AM    CREATININE 1.2 10/13/2023 07:00 AM    CREATININE 1.1 06/22/2023 09:15 AM    CREATININE 0.76 02/23/2014 10:15 AM     BNP: No results found for: \"PROBNP\"  Iron Studies:  No results found for: \"TIBC\", \"FERRITIN\"      Assessment/Plan:    1.  systolic congestive heart failure ([address]) - compensated   2. SOB (shortness of breath) - improved   3. Alcoholic cardiomyopathy (HCC) - increase BB, continue ARB, schedule echo   4. Atrial flutter, unspecified type ([address]) - s/p DCCV, continue BB and AC    5. CAD, s/p PCI - continue BB, plavix, ASA      Instructions:   Medications: increase metoprolol to 50mg once a day  Echo in 2months  Lifestyle Recommendations: Weigh yourself every day in the morning after urination, call [facility] if wt increases 2-3lb in one day or 5lb in one week, Limit sodium to 3000mg/day and fluids to 2L or 64oz/day.    Follow up: Raquel in 3months        [address]: [phone]      I appreciate the opportunity of cooperating in the care of this individual.    Chip Model, APRN - CNP, 10/16/2023,10:52 AM

## 2023-10-16 NOTE — TELEPHONE ENCOUNTER
----- Message from Adia Granados MD sent at 10/13/2023  7:31 PM EDT -----  Parisa Calle - I sent Mr. Roberto Carlos Enciso prescriptions in to his pharmacy    Can we have the MA's call him Monday to confirm he got his plavix filled, thanks!

## 2023-10-17 ENCOUNTER — OFFICE VISIT (OUTPATIENT)
Dept: CARDIOLOGY CLINIC | Age: 58
End: 2023-10-17
Payer: COMMERCIAL

## 2023-10-17 VITALS
OXYGEN SATURATION: 95 % | HEIGHT: 74 IN | BODY MASS INDEX: 23.49 KG/M2 | WEIGHT: 183 LBS | DIASTOLIC BLOOD PRESSURE: 76 MMHG | SYSTOLIC BLOOD PRESSURE: 118 MMHG | HEART RATE: 96 BPM

## 2023-10-17 DIAGNOSIS — I42.6 ALCOHOLIC CARDIOMYOPATHY (HCC): Primary | ICD-10-CM

## 2023-10-17 DIAGNOSIS — Z98.61 POST PTCA: ICD-10-CM

## 2023-10-17 DIAGNOSIS — I25.10 CORONARY ARTERY DISEASE INVOLVING NATIVE CORONARY ARTERY OF NATIVE HEART WITHOUT ANGINA PECTORIS: ICD-10-CM

## 2023-10-17 DIAGNOSIS — I50.22 CHRONIC SYSTOLIC CONGESTIVE HEART FAILURE (HCC): ICD-10-CM

## 2023-10-17 DIAGNOSIS — I48.92 ATRIAL FLUTTER, UNSPECIFIED TYPE (HCC): ICD-10-CM

## 2023-10-17 PROCEDURE — 3017F COLORECTAL CA SCREEN DOC REV: CPT | Performed by: NURSE PRACTITIONER

## 2023-10-17 PROCEDURE — 99214 OFFICE O/P EST MOD 30 MIN: CPT | Performed by: NURSE PRACTITIONER

## 2023-10-17 PROCEDURE — G8427 DOCREV CUR MEDS BY ELIG CLIN: HCPCS | Performed by: NURSE PRACTITIONER

## 2023-10-17 PROCEDURE — G8484 FLU IMMUNIZE NO ADMIN: HCPCS | Performed by: NURSE PRACTITIONER

## 2023-10-17 PROCEDURE — 1036F TOBACCO NON-USER: CPT | Performed by: NURSE PRACTITIONER

## 2023-10-17 PROCEDURE — G8420 CALC BMI NORM PARAMETERS: HCPCS | Performed by: NURSE PRACTITIONER

## 2023-10-17 RX ORDER — METOPROLOL SUCCINATE 50 MG/1
50 TABLET, EXTENDED RELEASE ORAL DAILY
Qty: 90 TABLET | Refills: 2 | Status: SHIPPED | OUTPATIENT
Start: 2023-10-17

## 2023-10-17 ASSESSMENT — ENCOUNTER SYMPTOMS
RESPIRATORY NEGATIVE: 1
GASTROINTESTINAL NEGATIVE: 1

## 2023-10-17 NOTE — PATIENT INSTRUCTIONS
Instructions:   Medications: increase metoprolol to 50mg once a day  Echo in 2months  Lifestyle Recommendations: Weigh yourself every day in the morning after urination, call Lora Blakely if wt increases 2-3lb in one day or 5lb in one week, Limit sodium to 3000mg/day and fluids to 2L or 64oz/day.    Follow up: Lora Blakely in [address]: [phone]

## 2023-10-20 ENCOUNTER — APPOINTMENT (OUTPATIENT)
Dept: CT IMAGING | Age: 58
End: 2023-10-20
Payer: COMMERCIAL

## 2023-10-20 ENCOUNTER — HOSPITAL ENCOUNTER (EMERGENCY)
Age: 58
Discharge: LEFT BEFORE DISCHARGE | End: 2023-10-20
Attending: EMERGENCY MEDICINE
Payer: COMMERCIAL

## 2023-10-20 ENCOUNTER — TELEPHONE (OUTPATIENT)
Dept: CARDIOLOGY CLINIC | Age: 58
End: 2023-10-20

## 2023-10-20 VITALS
OXYGEN SATURATION: 95 % | BODY MASS INDEX: 23.49 KG/M2 | TEMPERATURE: 98.3 F | DIASTOLIC BLOOD PRESSURE: 69 MMHG | HEART RATE: 85 BPM | HEIGHT: 74 IN | WEIGHT: 183 LBS | RESPIRATION RATE: 18 BRPM | SYSTOLIC BLOOD PRESSURE: 133 MMHG

## 2023-10-20 DIAGNOSIS — S20.212A CONTUSION OF LEFT CHEST WALL, INITIAL ENCOUNTER: ICD-10-CM

## 2023-10-20 DIAGNOSIS — R09.89 CHOKING EPISODE: ICD-10-CM

## 2023-10-20 DIAGNOSIS — Z79.01 ANTICOAGULATED: ICD-10-CM

## 2023-10-20 DIAGNOSIS — R79.89 TROPONIN LEVEL ELEVATED: ICD-10-CM

## 2023-10-20 DIAGNOSIS — Z53.29 LEFT AGAINST MEDICAL ADVICE: ICD-10-CM

## 2023-10-20 DIAGNOSIS — R55 SYNCOPE AND COLLAPSE: Primary | ICD-10-CM

## 2023-10-20 LAB
ALBUMIN SERPL-MCNC: 4.4 G/DL (ref 3.4–5)
ALBUMIN/GLOB SERPL: 2 {RATIO} (ref 1.1–2.2)
ALP SERPL-CCNC: 111 U/L (ref 40–129)
ALT SERPL-CCNC: 13 U/L (ref 10–40)
ANION GAP SERPL CALCULATED.3IONS-SCNC: 10 MMOL/L (ref 3–16)
AST SERPL-CCNC: 20 U/L (ref 15–37)
BASOPHILS # BLD: 0 K/UL (ref 0–0.2)
BASOPHILS NFR BLD: 0.5 %
BILIRUB SERPL-MCNC: 0.4 MG/DL (ref 0–1)
BUN SERPL-MCNC: 14 MG/DL (ref 7–20)
CALCIUM SERPL-MCNC: 9.4 MG/DL (ref 8.3–10.6)
CHLORIDE SERPL-SCNC: 103 MMOL/L (ref 99–110)
CO2 SERPL-SCNC: 22 MMOL/L (ref 21–32)
CREAT SERPL-MCNC: 1.3 MG/DL (ref 0.8–1.3)
DEPRECATED RDW RBC AUTO: 13.5 % (ref 12.4–15.4)
EKG ATRIAL RATE: 85 BPM
EKG DIAGNOSIS: NORMAL
EKG P AXIS: 74 DEGREES
EKG P-R INTERVAL: 138 MS
EKG Q-T INTERVAL: 352 MS
EKG QRS DURATION: 90 MS
EKG QTC CALCULATION (BAZETT): 418 MS
EKG R AXIS: 85 DEGREES
EKG T AXIS: 82 DEGREES
EKG VENTRICULAR RATE: 85 BPM
EOSINOPHIL # BLD: 0.1 K/UL (ref 0–0.6)
EOSINOPHIL NFR BLD: 1.2 %
ETHANOLAMINE SERPL-MCNC: NORMAL MG/DL (ref 0–0.08)
GFR SERPLBLD CREATININE-BSD FMLA CKD-EPI: >60 ML/MIN/{1.73_M2}
GLUCOSE SERPL-MCNC: 119 MG/DL (ref 70–99)
HCT VFR BLD AUTO: 44.7 % (ref 40.5–52.5)
HGB BLD-MCNC: 14.9 G/DL (ref 13.5–17.5)
INR PPP: 0.95 (ref 0.84–1.16)
LIPASE SERPL-CCNC: 29 U/L (ref 13–60)
LYMPHOCYTES # BLD: 1.6 K/UL (ref 1–5.1)
LYMPHOCYTES NFR BLD: 18.6 %
MCH RBC QN AUTO: 35.1 PG (ref 26–34)
MCHC RBC AUTO-ENTMCNC: 33.4 G/DL (ref 31–36)
MCV RBC AUTO: 105.2 FL (ref 80–100)
MONOCYTES # BLD: 0.7 K/UL (ref 0–1.3)
MONOCYTES NFR BLD: 7.8 %
NEUTROPHILS # BLD: 6.2 K/UL (ref 1.7–7.7)
NEUTROPHILS NFR BLD: 71.9 %
PLATELET # BLD AUTO: 263 K/UL (ref 135–450)
PMV BLD AUTO: 7.9 FL (ref 5–10.5)
POTASSIUM SERPL-SCNC: 4.6 MMOL/L (ref 3.5–5.1)
PROT SERPL-MCNC: 6.6 G/DL (ref 6.4–8.2)
PROTHROMBIN TIME: 12.7 SEC (ref 11.5–14.8)
RBC # BLD AUTO: 4.25 M/UL (ref 4.2–5.9)
SODIUM SERPL-SCNC: 135 MMOL/L (ref 136–145)
TROPONIN, HIGH SENSITIVITY: 59 NG/L (ref 0–22)
TROPONIN, HIGH SENSITIVITY: 59 NG/L (ref 0–22)
WBC # BLD AUTO: 8.6 K/UL (ref 4–11)

## 2023-10-20 PROCEDURE — 70450 CT HEAD/BRAIN W/O DYE: CPT

## 2023-10-20 PROCEDURE — 71260 CT THORAX DX C+: CPT

## 2023-10-20 PROCEDURE — 72125 CT NECK SPINE W/O DYE: CPT

## 2023-10-20 PROCEDURE — 85610 PROTHROMBIN TIME: CPT

## 2023-10-20 PROCEDURE — 99285 EMERGENCY DEPT VISIT HI MDM: CPT

## 2023-10-20 PROCEDURE — 6370000000 HC RX 637 (ALT 250 FOR IP): Performed by: EMERGENCY MEDICINE

## 2023-10-20 PROCEDURE — 85025 COMPLETE CBC W/AUTO DIFF WBC: CPT

## 2023-10-20 PROCEDURE — 83690 ASSAY OF LIPASE: CPT

## 2023-10-20 PROCEDURE — 82077 ASSAY SPEC XCP UR&BREATH IA: CPT

## 2023-10-20 PROCEDURE — 93010 ELECTROCARDIOGRAM REPORT: CPT | Performed by: INTERNAL MEDICINE

## 2023-10-20 PROCEDURE — 80053 COMPREHEN METABOLIC PANEL: CPT

## 2023-10-20 PROCEDURE — 84484 ASSAY OF TROPONIN QUANT: CPT

## 2023-10-20 PROCEDURE — 93005 ELECTROCARDIOGRAM TRACING: CPT | Performed by: PHYSICIAN ASSISTANT

## 2023-10-20 PROCEDURE — 6360000004 HC RX CONTRAST MEDICATION: Performed by: PHYSICIAN ASSISTANT

## 2023-10-20 RX ORDER — LIDOCAINE 4 G/G
1 PATCH TOPICAL ONCE
Status: DISCONTINUED | OUTPATIENT
Start: 2023-10-20 | End: 2023-10-20 | Stop reason: HOSPADM

## 2023-10-20 RX ORDER — HYDROCODONE BITARTRATE AND ACETAMINOPHEN 5; 325 MG/1; MG/1
2 TABLET ORAL ONCE
Status: DISCONTINUED | OUTPATIENT
Start: 2023-10-20 | End: 2023-10-20

## 2023-10-20 RX ADMIN — IOPAMIDOL 75 ML: 755 INJECTION, SOLUTION INTRAVENOUS at 12:03

## 2023-10-20 ASSESSMENT — ENCOUNTER SYMPTOMS
NAUSEA: 0
ABDOMINAL PAIN: 0
CONSTIPATION: 0
PHOTOPHOBIA: 0
VOMITING: 0
DIARRHEA: 0
SHORTNESS OF BREATH: 0
CHEST TIGHTNESS: 0

## 2023-10-20 ASSESSMENT — PAIN DESCRIPTION - DESCRIPTORS: DESCRIPTORS: ACHING

## 2023-10-20 ASSESSMENT — PAIN DESCRIPTION - ORIENTATION: ORIENTATION: LEFT

## 2023-10-20 ASSESSMENT — PAIN - FUNCTIONAL ASSESSMENT: PAIN_FUNCTIONAL_ASSESSMENT: 0-10

## 2023-10-20 ASSESSMENT — PAIN DESCRIPTION - LOCATION: LOCATION: RIB CAGE

## 2023-10-20 NOTE — ED NOTES
Pt stating he wants to leave and go home. AMA form signed by pt. Ambulated out Er exit with steady gait.       Llana Councilman, RN  10/20/23 [id number]

## 2023-10-20 NOTE — PROGRESS NOTES
Pharmacy Home Medication Reconciliation Note    A medication reconciliation has been completed for Caroline Sauer 1965    Pharmacy: [facility] [address] [place] [address], [address] [place] [address] provided by: Patient    The patient's home medication list is as follows: No current facility-administered medications on file prior to encounter. Current Outpatient Medications on File Prior to Encounter   Medication Sig Dispense Refill    metoprolol succinate (TOPROL XL) 50 MG extended release tablet Take 1 tablet by mouth daily 90 tablet 2    clopidogrel (PLAVIX) 75 MG tablet Take 1 tablet by mouth daily 90 tablet 1    tiotropium-olodaterol (STIOLTO) 2.5-2.5 MCG/ACT AERS Inhale 2 puffs into the lungs daily (Patient not taking: Reported on 10/20/2023) 1 each 5    albuterol sulfate HFA (PROVENTIL;VENTOLIN;PROAIR) 108 (90 Base) MCG/ACT inhaler Inhale 2 puffs into the lungs every 6 hours as needed for Wheezing 18 g 11    aspirin 81 MG EC tablet Take 1 tablet by mouth daily 90 tablet 1    valsartan (DIOVAN) 80 MG tablet Take 1 tablet by mouth daily 90 tablet 1    apixaban (ELIQUIS) 5 MG TABS tablet Take 1 tablet by mouth 2 times daily 180 tablet 1       Patient is no longer taking Stiolto 2.5-2.5    Of note, Patient takes Eliquis 5mg tab BID; Patient stated most recent dose was taken the evening of 10/19/23    Timing of last doses updated.     Thank you,  Hazel Galarza, Travis

## 2023-10-20 NOTE — TELEPHONE ENCOUNTER
Pt called stating they have fallen last night and they hurt them selves pretty bad so pt was not able to make CT scan, pt would like call back   Thank you

## 2023-10-20 NOTE — TELEPHONE ENCOUNTER
Pulmonologist ordered CT sacn . Should call central scheduling to reschedule. Please ensure she did not hit her head as she is on Eliquis and experiencing any bleeding.  If experiencing and pain or excessive bleeding, should go to ED for further evaluation

## 2023-10-25 ENCOUNTER — OFFICE VISIT (OUTPATIENT)
Dept: PULMONOLOGY | Age: 58
End: 2023-10-25
Payer: COMMERCIAL

## 2023-10-25 ENCOUNTER — TELEPHONE (OUTPATIENT)
Dept: CARDIOLOGY CLINIC | Age: 58
End: 2023-10-25

## 2023-10-25 VITALS — BODY MASS INDEX: 23.5 KG/M2 | OXYGEN SATURATION: 94 % | WEIGHT: 183 LBS | HEART RATE: 100 BPM

## 2023-10-25 DIAGNOSIS — R55 SYNCOPE, UNSPECIFIED SYNCOPE TYPE: Primary | ICD-10-CM

## 2023-10-25 DIAGNOSIS — Z87.891 PERSONAL HISTORY OF TOBACCO USE: ICD-10-CM

## 2023-10-25 DIAGNOSIS — J44.9 COPD, MODERATE (HCC): Primary | ICD-10-CM

## 2023-10-25 DIAGNOSIS — I48.92 ATRIAL FLUTTER, UNSPECIFIED TYPE (HCC): ICD-10-CM

## 2023-10-25 DIAGNOSIS — J96.11 CHRONIC HYPOXEMIC RESPIRATORY FAILURE (HCC): ICD-10-CM

## 2023-10-25 DIAGNOSIS — J43.2 CENTRILOBULAR EMPHYSEMA (HCC): ICD-10-CM

## 2023-10-25 PROCEDURE — G8484 FLU IMMUNIZE NO ADMIN: HCPCS | Performed by: INTERNAL MEDICINE

## 2023-10-25 PROCEDURE — 1036F TOBACCO NON-USER: CPT | Performed by: INTERNAL MEDICINE

## 2023-10-25 PROCEDURE — G8427 DOCREV CUR MEDS BY ELIG CLIN: HCPCS | Performed by: INTERNAL MEDICINE

## 2023-10-25 PROCEDURE — 3023F SPIROM DOC REV: CPT | Performed by: INTERNAL MEDICINE

## 2023-10-25 PROCEDURE — G8420 CALC BMI NORM PARAMETERS: HCPCS | Performed by: INTERNAL MEDICINE

## 2023-10-25 PROCEDURE — 99214 OFFICE O/P EST MOD 30 MIN: CPT | Performed by: INTERNAL MEDICINE

## 2023-10-25 PROCEDURE — 3017F COLORECTAL CA SCREEN DOC REV: CPT | Performed by: INTERNAL MEDICINE

## 2023-10-25 RX ORDER — BUDESONIDE, GLYCOPYRROLATE, AND FORMOTEROL FUMARATE 160; 9; 4.8 UG/1; UG/1; UG/1
2 AEROSOL, METERED RESPIRATORY (INHALATION) 2 TIMES DAILY
Qty: 1 EACH | Refills: 5 | Status: SHIPPED | OUTPATIENT
Start: 2023-10-25

## 2023-10-25 NOTE — TELEPHONE ENCOUNTER
Called Maikel:  Passed out 3x over the last week; sudden and without warning  The first time was after he had a coughing fit and after he stood up he \"went down like a tree\"  \"ER didn't find anything\"  The night before last night he passed out twice for a little over 1 minute while watching baseball  All three times he coughed, choked, sneezed prior  This has occurred intermittently over the last 8 years, but this was a higher amount than normal     Informed him of Dr. Danielson Later recommendation for holter monitor- he is in agreement  While attempting to schedule to come in for monitor, call dropped.   Attempted to call Lito Machuca back twice, no answer   LVM asking for him to callback to schedule

## 2023-10-25 NOTE — TELEPHONE ENCOUNTER
IVUS guided pRCA stenting 10/13/2023   PAF s/p DCCV 9/15/23  EF 35-40% by TTE 7/2023    ER 10/2023 for syncope/fall vs vasovagal/choking   Left AMA    Attempted to call Scout Alonso, no answer lvm for callback

## 2023-10-25 NOTE — TELEPHONE ENCOUNTER
Per Dr. Shabnam Ann will need 30 day event monitor for syncope  If SOB will need to have [facility]

## 2023-10-25 NOTE — TELEPHONE ENCOUNTER
PT states that since stent on 10/13 he has been experiencing blackouts when he sneezes or coughs. He went to the ER in the aftermath of a blackout fall on 10/20. Stent was done by [facility]     Please call to advise  Thank you.

## 2023-10-25 NOTE — TELEPHONE ENCOUNTER
Attempted to contact pt. Lilo Roberson JUN for pt to CB. Any CP, SOB, radiating pain. What is the BP and HR? Were the vital taking during these episodes?

## 2023-10-27 ENCOUNTER — TELEPHONE (OUTPATIENT)
Dept: ADMINISTRATIVE | Age: 58
End: 2023-10-27

## 2023-10-27 ENCOUNTER — TELEPHONE (OUTPATIENT)
Dept: PULMONOLOGY | Age: 58
End: 2023-10-27

## 2023-10-27 NOTE — TELEPHONE ENCOUNTER
Spoke with patient said [facility] works great. Sent prior auth ins will not cover [facility] unless he has tried for 14 days and failed two medications that are covered. Ins prefers Stiolto, Anoro, Advair, symbicort, dulera    We did send in for [facility] 9/29/2023 which is on his formulary. He did not .

## 2023-10-27 NOTE — TELEPHONE ENCOUNTER
Submitted PA for [employer] 160-9-4. 8MCG/ACT aerosol  Via [facility]: [id number] STATUS: PENDING. Follow up done daily; if no response in three days we will refax for status check. If another three days goes by with no response we will call the insurance for status.

## 2023-10-30 ENCOUNTER — NURSE ONLY (OUTPATIENT)
Dept: CARDIOLOGY CLINIC | Age: 58
End: 2023-10-30

## 2023-10-30 DIAGNOSIS — R55 SYNCOPE AND COLLAPSE: Primary | ICD-10-CM

## 2023-10-30 NOTE — PROGRESS NOTES
7 Day monitor requested for pt. Device was registered and placed. Tutorial given, pt verbalized understanding. Date 10/30/23  time 3:00pm  [id number]  laure   Patient refused 30 day monitor, but accepted 7 days.   laure

## 2023-10-30 NOTE — PROGRESS NOTES
7 Day E-Patch requested for pt. Device was registered and placed. Tutorial given, pt verbalized understanding. Date-10/30/23  Time-0300pm  [id number]/[id number]  Patient only accepted wearing the 7 day monitor instead of the 30 day.    tj

## 2023-11-12 NOTE — PROGRESS NOTES
evaluate secondary to irregular heart rate, appears mildly reduced.   -Ejection fraction is visually estimated to be 35-40%. There is diffuse hypokinesis.   -Indeterminate diastolic function due to irregular heart rhythm.   -Trivial mitral regurgitation.   -Mild tricuspid regurgitation.   -Estimated pulmonary artery systolic pressure is normal at 28 mmHg assuming a right atrial pressure of 3 mmHg. -The left atrium is mildly dilated. -Right ventricular borderline function. TAPSE 1.4 cm. RV S Velocity 9.86 cm/s. GXT: 9/23   Mild global hypokinesis with EF 52   Small-medium sized inferior apical minimally reversible defect of moderate intensity consistent with infarction in the territory of the mid and distal LCx and/or RCA . Cath: 9/23     Hemodynamics  LVEDP 9 mmHg   Left Main  No angiographically significant disease   LAD  Minor luminal irregularities  D1: No angiographically significant disease   Circ  No angiographically significant disease  OM1: No angiographically significant disease   RCA  Proximal vessel ulcerated 80% stenosis, large dominant vessel  rPDA: No angiographically significant disease         Interventions/Vessels  IVUS guided PCI of the RCA     - IVUS performed distal vessel with positive remodeling 4.5mm proximal vessel reference 4.2mm, plaque does not required calcium modification, fibrofatty plaque with necrotic core  - Xience 4.0x18mm SHAQUILLE deployed at 17 george  - Post dilation performed with a 4.0mm NC balloon to 19 george  - Post IVUS performed with no edge dissections and excellent apposition with full expansion, MSA 9.08 mm2      Guides/Wires  JR4 Guide   [employer] Opticross   Post % Stenosis  0   Closure Device  Radial hemostasis band   Complications  None       Assessment: 1. Persistent Atrial Fibrillation/Flutter   - S/p DCCV (9/23)  - Currently in NSR  - Continue Toprol XL 50 mg QD  - [id number] score:2; [id number] Vasc score and anticoagulation discussed.

## 2023-12-07 ENCOUNTER — TELEPHONE (OUTPATIENT)
Dept: CARDIOLOGY CLINIC | Age: 58
End: 2023-12-07

## 2023-12-07 NOTE — TELEPHONE ENCOUNTER
Pt called he re: a call from the office, no message was left, there's no encounter to reflect a call. If Pt is needed please call him.   Thank you

## 2023-12-11 ENCOUNTER — TELEPHONE (OUTPATIENT)
Dept: PULMONOLOGY | Age: 58
End: 2023-12-11

## 2023-12-11 ENCOUNTER — OFFICE VISIT (OUTPATIENT)
Dept: CARDIOLOGY CLINIC | Age: 58
End: 2023-12-11
Payer: COMMERCIAL

## 2023-12-11 VITALS
BODY MASS INDEX: 24.51 KG/M2 | DIASTOLIC BLOOD PRESSURE: 78 MMHG | WEIGHT: 191 LBS | HEIGHT: 74 IN | HEART RATE: 75 BPM | OXYGEN SATURATION: 96 % | SYSTOLIC BLOOD PRESSURE: 120 MMHG

## 2023-12-11 DIAGNOSIS — I50.22 CHRONIC SYSTOLIC CONGESTIVE HEART FAILURE (HCC): ICD-10-CM

## 2023-12-11 DIAGNOSIS — I25.10 CORONARY ARTERY DISEASE INVOLVING NATIVE CORONARY ARTERY OF NATIVE HEART WITHOUT ANGINA PECTORIS: ICD-10-CM

## 2023-12-11 DIAGNOSIS — I42.6 ALCOHOLIC CARDIOMYOPATHY (HCC): ICD-10-CM

## 2023-12-11 DIAGNOSIS — J96.11 CHRONIC HYPOXEMIC RESPIRATORY FAILURE (HCC): ICD-10-CM

## 2023-12-11 DIAGNOSIS — I48.92 ATRIAL FLUTTER, UNSPECIFIED TYPE (HCC): Primary | ICD-10-CM

## 2023-12-11 PROCEDURE — 93000 ELECTROCARDIOGRAM COMPLETE: CPT | Performed by: NURSE PRACTITIONER

## 2023-12-11 PROCEDURE — G8420 CALC BMI NORM PARAMETERS: HCPCS | Performed by: NURSE PRACTITIONER

## 2023-12-11 PROCEDURE — 3017F COLORECTAL CA SCREEN DOC REV: CPT | Performed by: NURSE PRACTITIONER

## 2023-12-11 PROCEDURE — G8484 FLU IMMUNIZE NO ADMIN: HCPCS | Performed by: NURSE PRACTITIONER

## 2023-12-11 PROCEDURE — G8427 DOCREV CUR MEDS BY ELIG CLIN: HCPCS | Performed by: NURSE PRACTITIONER

## 2023-12-11 PROCEDURE — 1036F TOBACCO NON-USER: CPT | Performed by: NURSE PRACTITIONER

## 2023-12-11 PROCEDURE — 99214 OFFICE O/P EST MOD 30 MIN: CPT | Performed by: NURSE PRACTITIONER

## 2023-12-11 RX ORDER — CLOPIDOGREL BISULFATE 75 MG/1
75 TABLET ORAL DAILY
COMMUNITY

## 2023-12-11 NOTE — TELEPHONE ENCOUNTER
Patient called and said that the [facility] is not working and that he really liked the Gladys and said if we send something to care source they should be able to cover medication     [id number] [address]: [phone]

## 2023-12-12 NOTE — TELEPHONE ENCOUNTER
Patient last OV was 10/25/2023 does he new OV to discuss new med or could we send in for alternative.

## 2023-12-14 DIAGNOSIS — R07.9 CHEST PAIN, UNSPECIFIED TYPE: ICD-10-CM

## 2023-12-14 DIAGNOSIS — I48.92 ATRIAL FLUTTER, UNSPECIFIED TYPE (HCC): ICD-10-CM

## 2023-12-14 DIAGNOSIS — L81.9 DISCOLORATION OF SKIN OF LOWER LEG: ICD-10-CM

## 2023-12-14 DIAGNOSIS — R06.02 SOB (SHORTNESS OF BREATH): ICD-10-CM

## 2023-12-14 DIAGNOSIS — Z87.891 HX OF SMOKING: ICD-10-CM

## 2023-12-14 DIAGNOSIS — R55 SYNCOPE, UNSPECIFIED SYNCOPE TYPE: ICD-10-CM

## 2023-12-15 RX ORDER — APIXABAN 5 MG/1
5 TABLET, FILM COATED ORAL 2 TIMES DAILY
Qty: 180 TABLET | Refills: 1 | Status: SHIPPED | OUTPATIENT
Start: 2023-12-15

## 2023-12-26 ENCOUNTER — TELEPHONE (OUTPATIENT)
Dept: PULMONOLOGY | Age: 58
End: 2023-12-26

## 2023-12-26 DIAGNOSIS — J44.9 COPD, MODERATE (HCC): Primary | ICD-10-CM

## 2023-12-26 RX ORDER — ALBUTEROL SULFATE 90 UG/1
2 AEROSOL, METERED RESPIRATORY (INHALATION) EVERY 6 HOURS PRN
Qty: 18 G | Refills: 2 | Status: SHIPPED | OUTPATIENT
Start: 2023-12-26 | End: 2024-12-25

## 2023-12-26 NOTE — TELEPHONE ENCOUNTER
Agueda Moreland has been approved. Patient notified. Patient needs refill on rescue inhaler.     Last appointment:  10/25/2023    Next appointment:  Visit date not found    Last refill:     albuterol sulfate HFA (PROVENTIL;VENTOLIN;PROAIR) 108 (90 Base) MCG/ACT inhaler [[id number]]    Order Details  Dose: 2 puff Route: Inhalation Frequency: EVERY 6 HOURS PRN for Wheezing   Dispense Quantity: 18 g Refills: 11          Sig: Inhale 2 puffs into the lungs every 6 hours as needed for Wheezing

## 2023-12-26 NOTE — TELEPHONE ENCOUNTER
Patient called and wanted to know the status on the PA for his breztri, said the [place]ine Fell is becoming to hard for him to take a puff without feeling the need to gag or cough, said he has been using his emergency inhaler and is currently out of inhaler. [facility]       [id number] [address]: [phone]

## 2024-02-19 ENCOUNTER — TELEPHONE (OUTPATIENT)
Dept: CARDIOLOGY CLINIC | Age: 59
End: 2024-02-19

## 2024-02-19 DIAGNOSIS — T14.8XXA BRUISING: Primary | ICD-10-CM

## 2024-02-19 NOTE — TELEPHONE ENCOUNTER
Please let patient know we have ordered blood work. If it shows his h/h are low them we will have him come in to discuss watchman with provider.     Please ask him to call when he has this done so we can watch for results.

## 2024-02-19 NOTE — TELEPHONE ENCOUNTER
Pt is reporting excessive bruising.  He feels great, except for the excessive bruising and he is feeling fragile.    He was coughing, and it bruised his rib and feels like he broke a rib.     Please advise

## 2024-02-19 NOTE — TELEPHONE ENCOUNTER
Called and spoke with patient he c/o of having flu like sx's on last week. He did take OTC Nyquil for sx's. He states that he notices that he bruises very easily. All though he does c/o a large bruise on LLF Right side mid way. He would like to have a call back. He does deny any chest, sob, or swelling of his extremities.

## 2024-02-19 NOTE — TELEPHONE ENCOUNTER
Patient states he has had history of coughing and passing out once just before he had stent placed.     States he has bad bruising and he feels he bleeds excessively with with small scratches or cuts. Denies hematuria, melena, or epistaxis.  States are his ribs are very sore and he is afraid to cough.     Advised he call his PCP regarding his cough.  He states he does not have a PCP. He follows wit Dr. Matson for COPD - advised he call  and establish with PCP .     Discussed bracing when he coughs.     Advised to call with [facility], eduar pollock, epistaxis.

## 2024-02-19 NOTE — TELEPHONE ENCOUNTER
Spoke with pt about the message below. He v/u and stated he will get the blood work done tomorrow.

## 2024-02-22 ENCOUNTER — HOSPITAL ENCOUNTER (OUTPATIENT)
Age: 59
Discharge: HOME | End: 2024-02-22
Payer: COMMERCIAL

## 2024-02-22 DIAGNOSIS — T14.8XXA BRUISING: ICD-10-CM

## 2024-02-22 LAB
DEPRECATED RDW RBC AUTO: 13.7 % (ref 12.4–15.4)
HCT VFR BLD AUTO: 42 % (ref 40.5–52.5)
HGB BLD-MCNC: 14.7 G/DL (ref 13.5–17.5)
MCH RBC QN AUTO: 35.7 PG (ref 26–34)
MCHC RBC AUTO-ENTMCNC: 35 G/DL (ref 31–36)
MCV RBC AUTO: 102 FL (ref 80–100)
PLATELET # BLD AUTO: 250 K/UL (ref 135–450)
PMV BLD AUTO: 7.6 FL (ref 5–10.5)
RBC # BLD AUTO: 4.11 M/UL (ref 4.2–5.9)
WBC # BLD AUTO: 6.4 K/UL (ref 4–11)

## 2024-02-22 PROCEDURE — 85027 COMPLETE CBC AUTOMATED: CPT

## 2024-02-22 PROCEDURE — 36415 COLL VENOUS BLD VENIPUNCTURE: CPT

## 2024-05-02 RX ORDER — CLOPIDOGREL BISULFATE 75 MG/1
75 TABLET ORAL DAILY
Qty: 90 TABLET | OUTPATIENT
Start: 2024-05-02

## 2024-05-08 ENCOUNTER — TELEPHONE (OUTPATIENT)
Dept: CARDIOLOGY CLINIC | Age: 59
End: 2024-05-08

## 2024-05-08 RX ORDER — CLOPIDOGREL BISULFATE 75 MG/1
75 TABLET ORAL DAILY
Qty: 90 TABLET | Refills: 1 | Status: SHIPPED | OUTPATIENT
Start: 2024-05-08

## 2024-05-08 NOTE — TELEPHONE ENCOUNTER
Pt calling in requesting clarification if he still needs to take the palvix as he was having issues at the pharmacy   Please advise  Thankyou

## 2024-05-08 NOTE — TELEPHONE ENCOUNTER
He needs to be on plavix. He had a stent placed in October. He should be on both plavix, and eliquis. Please ensure he is not taking aspirin any longer.

## 2024-05-08 NOTE — TELEPHONE ENCOUNTER
I spoke to pt. He stated that he is not taking aspirin and he is taking Valsartan, Metoprolol and Eliquis. He also said that the pharmacy did not received the prescription for plavix.

## 2024-06-22 PROBLEM — Z98.61 POST PTCA: Status: RESOLVED | Noted: 2023-10-17 | Resolved: 2024-06-22

## 2024-06-22 PROBLEM — R55 SYNCOPE: Status: RESOLVED | Noted: 2019-03-11 | Resolved: 2024-06-22

## 2024-06-22 NOTE — PROGRESS NOTES
[facility]   Electrophysiology  CANDY Lopez  Attending EP: Dr. Mendoza    Date: 7/22/2024  I had the privilege of visiting Maikel Campbell in the office.     Chief Complaint:   Chief Complaint   Patient presents with    Atrial Fibrillation     Had episode a few days ago of near syncope.     Follow-up     History of Present Illness: History obtained from patient and medical record.    Maikel Campbell is 59 y.o. male with a past medical history of atrial flutter, CHF, CAD, alcohol abuse, nicotine abuse, marijuana usage and syncope. S/p DCC (9/23)    -Interval history: Today, Maikel Campbell is being seen for routine follow up. He is doing pretty well. He states he had some dizzy spells in the last few weeks. He felt nauseated and sweaty. He does admit he was outside working ([employer], etc) and he was not drinking much water. He never completed the echo as discussed at last visit. He does attest to some ED/sexual dysfunction since starting metoprolol.    Denies having chest pain, palpitations, shortness of breath, orthopnea/PND, cough, or dizziness at the time of this visit.    With regard to medication therapy the patient has been compliant with prescribed regimen. They have tolerated therapy to date.     Allergies:  No Known Allergies    Home Medications:  Prior to Visit Medications    Medication Sig Taking? Authorizing Provider   metoprolol succinate (TOPROL XL) 50 MG extended release tablet Take 0.5 tablets by mouth daily Yes Olman Peña APRN - CNP   valsartan (DIOVAN) 80 MG tablet TAKE 1 TABLET BY MOUTH DAILY Yes Olman Peña APRN - CNP   clopidogrel (PLAVIX) 75 MG tablet Take 1 tablet by mouth daily Yes Olman Peña APRN - CNP   albuterol sulfate HFA (PROVENTIL;VENTOLIN;PROAIR) 108 (90 Base) MCG/ACT inhaler Inhale 2 puffs into the lungs every 6 hours as needed for Wheezing Yes Brice Rooney MD   ELIQUIS 5 MG TABS tablet TAKE 1 TABLET BY MOUTH TWICE DAILY Yes

## 2024-06-29 DIAGNOSIS — I48.92 ATRIAL FLUTTER, UNSPECIFIED TYPE (HCC): ICD-10-CM

## 2024-06-29 DIAGNOSIS — R06.02 SOB (SHORTNESS OF BREATH): ICD-10-CM

## 2024-06-29 DIAGNOSIS — R07.9 CHEST PAIN, UNSPECIFIED TYPE: ICD-10-CM

## 2024-06-29 DIAGNOSIS — R55 SYNCOPE, UNSPECIFIED SYNCOPE TYPE: ICD-10-CM

## 2024-06-29 DIAGNOSIS — Z87.891 HX OF SMOKING: ICD-10-CM

## 2024-06-29 DIAGNOSIS — L81.9 DISCOLORATION OF SKIN OF LOWER LEG: ICD-10-CM

## 2024-07-01 RX ORDER — VALSARTAN 80 MG/1
80 TABLET ORAL DAILY
Qty: 90 TABLET | Refills: 1 | Status: SHIPPED | OUTPATIENT
Start: 2024-07-01

## 2024-07-22 ENCOUNTER — OFFICE VISIT (OUTPATIENT)
Dept: CARDIOLOGY CLINIC | Age: 59
End: 2024-07-22
Payer: COMMERCIAL

## 2024-07-22 ENCOUNTER — TELEPHONE (OUTPATIENT)
Dept: CARDIOLOGY CLINIC | Age: 59
End: 2024-07-22

## 2024-07-22 VITALS
HEART RATE: 70 BPM | SYSTOLIC BLOOD PRESSURE: 134 MMHG | BODY MASS INDEX: 24.38 KG/M2 | DIASTOLIC BLOOD PRESSURE: 80 MMHG | HEIGHT: 74 IN | OXYGEN SATURATION: 98 % | WEIGHT: 190 LBS

## 2024-07-22 DIAGNOSIS — I50.22 CHRONIC SYSTOLIC CONGESTIVE HEART FAILURE (HCC): ICD-10-CM

## 2024-07-22 DIAGNOSIS — J44.9 COPD, MODERATE (HCC): ICD-10-CM

## 2024-07-22 DIAGNOSIS — I48.92 ATRIAL FLUTTER, UNSPECIFIED TYPE (HCC): Primary | ICD-10-CM

## 2024-07-22 DIAGNOSIS — I25.10 CORONARY ARTERY DISEASE INVOLVING NATIVE CORONARY ARTERY OF NATIVE HEART WITHOUT ANGINA PECTORIS: ICD-10-CM

## 2024-07-22 DIAGNOSIS — I42.6 ALCOHOLIC CARDIOMYOPATHY (HCC): ICD-10-CM

## 2024-07-22 DIAGNOSIS — J96.11 CHRONIC HYPOXEMIC RESPIRATORY FAILURE (HCC): ICD-10-CM

## 2024-07-22 DIAGNOSIS — J43.2 CENTRILOBULAR EMPHYSEMA (HCC): ICD-10-CM

## 2024-07-22 PROCEDURE — 3023F SPIROM DOC REV: CPT | Performed by: NURSE PRACTITIONER

## 2024-07-22 PROCEDURE — 1036F TOBACCO NON-USER: CPT | Performed by: NURSE PRACTITIONER

## 2024-07-22 PROCEDURE — 3017F COLORECTAL CA SCREEN DOC REV: CPT | Performed by: NURSE PRACTITIONER

## 2024-07-22 PROCEDURE — G8427 DOCREV CUR MEDS BY ELIG CLIN: HCPCS | Performed by: NURSE PRACTITIONER

## 2024-07-22 PROCEDURE — 99214 OFFICE O/P EST MOD 30 MIN: CPT | Performed by: NURSE PRACTITIONER

## 2024-07-22 PROCEDURE — G8420 CALC BMI NORM PARAMETERS: HCPCS | Performed by: NURSE PRACTITIONER

## 2024-07-22 PROCEDURE — 93000 ELECTROCARDIOGRAM COMPLETE: CPT | Performed by: NURSE PRACTITIONER

## 2024-07-22 RX ORDER — METOPROLOL SUCCINATE 50 MG/1
25 TABLET, EXTENDED RELEASE ORAL DAILY
Qty: 90 TABLET | Refills: 2
Start: 2024-07-22

## 2024-07-22 NOTE — TELEPHONE ENCOUNTER
Pt  was in today and saw NPSR and needs echo which is scheduled for 8/20. Please place order for echo   Thank you

## 2024-07-31 ENCOUNTER — TELEPHONE (OUTPATIENT)
Dept: CARDIOLOGY CLINIC | Age: 59
End: 2024-07-31

## 2024-07-31 NOTE — TELEPHONE ENCOUNTER
Can you please schedule in 2 months with Dr. Espitia.  I am not sure if he prefers [facility] or [facility].        Thank you     Colette

## 2024-07-31 NOTE — TELEPHONE ENCOUNTER
----- Message from Tom Espiita MD sent at 7/27/2024  9:58 PM EDT -----  Thanks tyler Mantilla can we make sure Mr. Campbell is on our schedule for at least yearly follow ups, we can see him in a few months    He is a PCI patient of [facility]  ----- Message -----  From: Olman Peña APRN - CNP  Sent: 7/22/2024   1:44 PM EDT  To: Tom Espitia MD    This is the kim you stented last year

## 2024-08-10 DIAGNOSIS — I48.92 ATRIAL FLUTTER, UNSPECIFIED TYPE (HCC): ICD-10-CM

## 2024-08-10 DIAGNOSIS — R06.02 SOB (SHORTNESS OF BREATH): ICD-10-CM

## 2024-08-10 DIAGNOSIS — R07.9 CHEST PAIN, UNSPECIFIED TYPE: ICD-10-CM

## 2024-08-10 DIAGNOSIS — L81.9 DISCOLORATION OF SKIN OF LOWER LEG: ICD-10-CM

## 2024-08-10 DIAGNOSIS — R55 SYNCOPE, UNSPECIFIED SYNCOPE TYPE: ICD-10-CM

## 2024-08-10 DIAGNOSIS — Z87.891 HX OF SMOKING: ICD-10-CM

## 2024-08-12 RX ORDER — APIXABAN 5 MG/1
5 TABLET, FILM COATED ORAL 2 TIMES DAILY
Qty: 180 TABLET | Refills: 1 | Status: SHIPPED | OUTPATIENT
Start: 2024-08-12

## 2024-08-12 NOTE — TELEPHONE ENCOUNTER
Received refill request for ELIQUIS 5 MG TABS  from [facility] pharmacy.     Last OV: 7/22/2024 NPSR    Next OV: 10/7/2024 CBM    Last Labs: 2/22/2024 CBC    Last Filled: 12/15/2023 MXA

## 2024-08-21 ENCOUNTER — TELEPHONE (OUTPATIENT)
Dept: CARDIOLOGY CLINIC | Age: 59
End: 2024-08-21

## 2024-08-21 NOTE — TELEPHONE ENCOUNTER
Pt called in letting MXA know that he was unaware of echo yesterday and thought it was today, pt did not make appt and is rescheduled for echo 9/18   Please place order for echo thank you

## 2024-09-03 ENCOUNTER — TELEPHONE (OUTPATIENT)
Dept: CARDIOLOGY CLINIC | Age: 59
End: 2024-09-03

## 2024-09-03 DIAGNOSIS — I48.92 ATRIAL FLUTTER, UNSPECIFIED TYPE (HCC): ICD-10-CM

## 2024-09-03 RX ORDER — METOPROLOL SUCCINATE 50 MG/1
50 TABLET, EXTENDED RELEASE ORAL DAILY
Qty: 90 TABLET | Refills: 2 | Status: SHIPPED | OUTPATIENT
Start: 2024-09-03

## 2024-09-03 NOTE — TELEPHONE ENCOUNTER
Received refill request for Metoprolol 50mg from [facility] pharmacy.     Last OV: 7/22/2024 NPSR    Next OV:  10/7/2024 CBM    Last Labs: 7/22/2024 EKG    Last Filled: 7/22/2024 Sig adjusted NPSR

## 2024-09-03 NOTE — TELEPHONE ENCOUNTER
Pt called stating pharmacy does not have any more refills, and they took their last tablet today. Pt stated they are taking 1 tablet daily     Medication Refill  Medication needing refilled:  metoprolol succinate (TOPROL XL) 50 MG extended release tablet   Dosage of the medication:   50 MG   How are you taking this medication (QD, BID, TID, QID, PRN):   1 tablet daily   30 or 90 day supply called in:   90 day supply   When will you run out of your medication:   Last tablet taken am 9/3      Which Pharmacy are we sending the medication to?:   [id number] - [place], [place] - [address] [place] [address] - P [phone] - F [phone]  [address] [facility] [address]  Phone: [phone]  Fax: [phone]

## 2024-09-03 NOTE — TELEPHONE ENCOUNTER
Ok to fill for the full tablet?    Your office note said  - Continue Toprol XL (will reduce to 25 mg daily given complaints of ED and sexual dysfunction since starting)

## 2024-09-18 ENCOUNTER — HOSPITAL ENCOUNTER (OUTPATIENT)
Age: 59
Discharge: HOME | End: 2024-09-20
Payer: COMMERCIAL

## 2024-09-18 VITALS
DIASTOLIC BLOOD PRESSURE: 75 MMHG | BODY MASS INDEX: 24.38 KG/M2 | WEIGHT: 190 LBS | SYSTOLIC BLOOD PRESSURE: 114 MMHG | HEIGHT: 74 IN

## 2024-09-18 DIAGNOSIS — I48.92 ATRIAL FLUTTER, UNSPECIFIED TYPE (HCC): ICD-10-CM

## 2024-09-18 LAB
ECHO BSA: 2.12 M2
ECHO LV EF PHYSICIAN: 53 %

## 2024-09-18 PROCEDURE — 93325 DOPPLER ECHO COLOR FLOW MAPG: CPT

## 2024-09-19 ENCOUNTER — TELEPHONE (OUTPATIENT)
Dept: CARDIOLOGY CLINIC | Age: 59
End: 2024-09-19

## 2024-09-20 ENCOUNTER — TELEPHONE (OUTPATIENT)
Dept: CARDIOLOGY CLINIC | Age: 59
End: 2024-09-20

## 2024-10-02 DIAGNOSIS — J44.9 COPD, MODERATE (HCC): ICD-10-CM

## 2024-10-02 NOTE — TELEPHONE ENCOUNTER
Patient called and also needs a refill on [facility] #[id number] - [address] - P [phone] - F [phone]

## 2024-10-03 RX ORDER — ALBUTEROL SULFATE 90 UG/1
2 INHALANT RESPIRATORY (INHALATION) EVERY 6 HOURS PRN
Qty: 18 G | Refills: 2 | OUTPATIENT
Start: 2024-10-03 | End: 2025-10-03

## 2024-10-04 DIAGNOSIS — J43.2 CENTRILOBULAR EMPHYSEMA (HCC): ICD-10-CM

## 2024-10-04 DIAGNOSIS — J44.9 COPD, MODERATE (HCC): Primary | ICD-10-CM

## 2024-10-04 RX ORDER — BUDESONIDE, GLYCOPYRROLATE, AND FORMOTEROL FUMARATE 160; 9; 4.8 UG/1; UG/1; UG/1
2 AEROSOL, METERED RESPIRATORY (INHALATION) 2 TIMES DAILY
Qty: 10.7 G | Refills: 3 | Status: SHIPPED | OUTPATIENT
Start: 2024-10-04

## 2024-10-09 DIAGNOSIS — J43.2 CENTRILOBULAR EMPHYSEMA (HCC): ICD-10-CM

## 2024-10-09 DIAGNOSIS — J44.9 COPD, MODERATE (HCC): ICD-10-CM

## 2024-10-10 RX ORDER — BUDESONIDE, GLYCOPYRROLATE, AND FORMOTEROL FUMARATE 160; 9; 4.8 UG/1; UG/1; UG/1
2 AEROSOL, METERED RESPIRATORY (INHALATION) 2 TIMES DAILY
Qty: 10.7 G | Refills: 0 | Status: SHIPPED | OUTPATIENT
Start: 2024-10-10

## 2024-10-10 RX ORDER — ALBUTEROL SULFATE 90 UG/1
2 INHALANT RESPIRATORY (INHALATION) EVERY 6 HOURS PRN
Qty: 18 G | Refills: 3 | Status: SHIPPED | OUTPATIENT
Start: 2024-10-10 | End: 2025-10-10

## 2024-10-28 ENCOUNTER — OFFICE VISIT (OUTPATIENT)
Dept: CARDIOLOGY CLINIC | Age: 59
End: 2024-10-28

## 2024-10-28 ENCOUNTER — OFFICE VISIT (OUTPATIENT)
Dept: PULMONOLOGY | Age: 59
End: 2024-10-28
Payer: COMMERCIAL

## 2024-10-28 VITALS
OXYGEN SATURATION: 95 % | BODY MASS INDEX: 25.41 KG/M2 | HEIGHT: 74 IN | WEIGHT: 198 LBS | DIASTOLIC BLOOD PRESSURE: 88 MMHG | SYSTOLIC BLOOD PRESSURE: 146 MMHG | HEART RATE: 84 BPM

## 2024-10-28 VITALS
HEART RATE: 86 BPM | OXYGEN SATURATION: 95 % | SYSTOLIC BLOOD PRESSURE: 136 MMHG | HEIGHT: 74 IN | DIASTOLIC BLOOD PRESSURE: 82 MMHG | WEIGHT: 199 LBS | BODY MASS INDEX: 25.54 KG/M2

## 2024-10-28 DIAGNOSIS — J43.2 CENTRILOBULAR EMPHYSEMA (HCC): ICD-10-CM

## 2024-10-28 DIAGNOSIS — J44.9 COPD, MODERATE (HCC): ICD-10-CM

## 2024-10-28 DIAGNOSIS — I27.20 PULMONARY HYPERTENSION (HCC): ICD-10-CM

## 2024-10-28 DIAGNOSIS — I25.10 CORONARY ARTERY DISEASE INVOLVING NATIVE CORONARY ARTERY OF NATIVE HEART WITHOUT ANGINA PECTORIS: Primary | ICD-10-CM

## 2024-10-28 DIAGNOSIS — Z87.891 PERSONAL HISTORY OF TOBACCO USE: Primary | ICD-10-CM

## 2024-10-28 PROCEDURE — G8427 DOCREV CUR MEDS BY ELIG CLIN: HCPCS | Performed by: INTERNAL MEDICINE

## 2024-10-28 PROCEDURE — G8419 CALC BMI OUT NRM PARAM NOF/U: HCPCS | Performed by: INTERNAL MEDICINE

## 2024-10-28 PROCEDURE — G8484 FLU IMMUNIZE NO ADMIN: HCPCS | Performed by: INTERNAL MEDICINE

## 2024-10-28 PROCEDURE — 3017F COLORECTAL CA SCREEN DOC REV: CPT | Performed by: INTERNAL MEDICINE

## 2024-10-28 PROCEDURE — 99214 OFFICE O/P EST MOD 30 MIN: CPT | Performed by: INTERNAL MEDICINE

## 2024-10-28 PROCEDURE — 1036F TOBACCO NON-USER: CPT | Performed by: INTERNAL MEDICINE

## 2024-10-28 PROCEDURE — 3023F SPIROM DOC REV: CPT | Performed by: INTERNAL MEDICINE

## 2024-10-28 PROCEDURE — G0296 VISIT TO DETERM LDCT ELIG: HCPCS | Performed by: INTERNAL MEDICINE

## 2024-10-28 RX ORDER — SILDENAFIL CITRATE 20 MG/1
20 TABLET ORAL DAILY PRN
Qty: 5 TABLET | Refills: 3 | Status: SHIPPED | OUTPATIENT
Start: 2024-10-28

## 2024-10-28 RX ORDER — BUDESONIDE, GLYCOPYRROLATE, AND FORMOTEROL FUMARATE 160; 9; 4.8 UG/1; UG/1; UG/1
2 AEROSOL, METERED RESPIRATORY (INHALATION) 2 TIMES DAILY
Qty: 10.7 G | Refills: 5 | Status: SHIPPED | OUTPATIENT
Start: 2024-10-28

## 2024-10-28 RX ORDER — AMLODIPINE BESYLATE 10 MG/1
10 TABLET ORAL DAILY
Qty: 90 TABLET | Refills: 3 | Status: SHIPPED | OUTPATIENT
Start: 2024-10-28

## 2024-10-28 NOTE — PROGRESS NOTES
[facility]  Consult Note  MD Maikel Harris Shannan   YOB: 1965    Date of Visit:  10/28/2024    Assessment/Plan:  1. COPD, moderate (HCC)  PFT 9/8/2023:  Spirometry:   Flow volume loops were obstructed. The FEV-1/FVC ratio was   decreased. The  post-bronchodilator FEV-1 was 3.14 liters (76% of   predicted), which was moderately decreased. The FVC was 5.07   liters (93% of predicted), which was decreased. Response to   inhaled bronchodilators (albuterol) was significant.     Lung volumes:   Lung volumes were tested by plethysmography. The total lung   capacity was 9.73 liters (128% of predicted), which was   increased. The residual volume was 5.58 liters (211% of   predicted), which was increased. The ratio of residual volume to   total lung capacity (RV/TLC) was 154, which was increased.   Specific airway resistance was increased.     Diffusion capacity was found to be decreased.       Interpretation:   Moderate obstructive airway disease with hyperinflation and   reduced diffusion capacity.    Patient is short of breath with minimal exertion.  I reviewed pulmonary function testing and my impression is moderate to severe obstructive lung disease with hyperinflation and air trapping.    Medication management:  Start Trelegy 100, once daily ==> Not on his formulary  Breztri ==> has really helped him  Use a spacer with this  Albuterol as needed and in advance of activity    2. Centrilobular emphysema (HCC)/former smoker  CT Chest 10/23:  FINDINGS:  Mediastinum: Multi-vessel coronary artery calcification.  No pericardial  effusion.  Thoracic aorta is normal in size.  No mediastinal hematoma.  Thyroid and esophagus are normal.     Lungs/Pleura: Mild-to-moderate pulmonary emphysema and chronic bronchial wall  thickening.  No pulmonary mass.  There is linear opacity in the anterior  right lower lobe and mild haziness in the right lung base.  No

## 2024-10-28 NOTE — PROGRESS NOTES
[facility]      Cardiology Consult    Maikel Campbell  1965    Referring Physician: None, None  Reason for Referral: Abnormal stress test    CC: \"I feel great\"    HPI:  The patient is 59 y.o. male with a past medical history significant for afib and hyperlipidemia, Aflutter, CHF, CAD, alcohol abuse, nicotine abuse, marijuana usage and syncope.  S/P DCCV ().    Past Medical History:   Diagnosis Date    Afib (HCC)     Hyperlipidemia      History reviewed. No pertinent surgical history.  Family History   Problem Relation Age of Onset    Heart Disease Mother      Social History     Tobacco Use    Smoking status: Former     Current packs/day: 0.00     Average packs/day: 1 pack/day for 40.0 years (40.0 ttl pk-yrs)     Types: Cigarettes     Start date: 1982     Quit date: 2022     Years since quittin.8   Substance Use Topics    Alcohol use: Yes     Comment: occ       No Known Allergies  Current Outpatient Medications   Medication Sig Dispense Refill    Budeson-Glycopyrrol-Formoterol (BREZTRI AEROSPHERE) 160-9-4.8 MCG/ACT AERO Inhale 2 puffs into the lungs 2 times daily 10.7 g 5    albuterol sulfate HFA (PROVENTIL;VENTOLIN;PROAIR) 108 (90 Base) MCG/ACT inhaler Inhale 2 puffs into the lungs every 6 hours as needed for Wheezing 18 g 3    metoprolol succinate (TOPROL XL) 50 MG extended release tablet Take 1 tablet by mouth daily 90 tablet 2    ELIQUIS 5 MG TABS tablet TAKE 1 TABLET BY MOUTH TWICE DAILY 180 tablet 1    valsartan (DIOVAN) 80 MG tablet TAKE 1 TABLET BY MOUTH DAILY 90 tablet 1    clopidogrel (PLAVIX) 75 MG tablet Take 1 tablet by mouth daily 90 tablet 1     No current facility-administered medications for this visit.       Review of Systems:  Constitutional: No unanticipated weight loss. There's been no change in energy level, sleep pattern, or activity level. No fevers, chills.   Eyes: No visual changes or diplopia. No scleral icterus.  ENT: No

## 2024-10-28 NOTE — PATIENT INSTRUCTIONS
follow-up, he or she will help you understand what to do next.  After a lung cancer screening, you can go back to your usual activities right away.  A lung cancer screening test can't tell if you have lung cancer. If your results are positive, your doctor can't tell whether an abnormal finding is a harmless nodule, cancer, or something else without doing more tests.  What can you do to help prevent lung cancer?  Some lung cancers can't be prevented. But if you smoke, quitting smoking is the best step you can take to prevent lung cancer. If you want to quit, your doctor can recommend medicines or other ways to help.  Follow-up care is a key part of your treatment and safety. Be sure to make and go to all appointments, and call your doctor if you are having problems. It's also a good idea to know your test results and keep a list of the medicines you take.  Where can you learn more?  Go to https://www.[employer].net/patientEd and enter Q940 to learn more about \"Learning About Lung Cancer Screening.\"  Current as of: October 25, 2023  Content Version: 14.2  © 2024 [employer].   Care instructions adapted under license by [email or web address]. If you have questions about a medical condition or this instruction, always ask your healthcare professional. [employer] disclaims any warranty or liability for your use of this information.

## 2024-11-19 ENCOUNTER — HOSPITAL ENCOUNTER (OUTPATIENT)
Dept: CT IMAGING | Age: 59
Discharge: HOME | End: 2024-11-19
Attending: INTERNAL MEDICINE
Payer: COMMERCIAL

## 2024-11-19 DIAGNOSIS — Z87.891 PERSONAL HISTORY OF TOBACCO USE: ICD-10-CM

## 2024-11-19 PROCEDURE — 71271 CT THORAX LUNG CANCER SCR C-: CPT

## 2024-11-22 ENCOUNTER — HOSPITAL ENCOUNTER (OUTPATIENT)
Dept: VASCULAR LAB | Age: 59
Discharge: HOME | End: 2024-11-24
Attending: STUDENT IN AN ORGANIZED HEALTH CARE EDUCATION/TRAINING PROGRAM

## 2024-11-22 DIAGNOSIS — I25.10 CORONARY ARTERY DISEASE INVOLVING NATIVE CORONARY ARTERY OF NATIVE HEART WITHOUT ANGINA PECTORIS: ICD-10-CM

## 2024-11-22 NOTE — PROGRESS NOTES
Negative.         Physical Examination:    Vitals:    11/25/24 1032   BP: (!) 156/90   Site: Right Upper Arm   Position: Sitting   Cuff Size: Medium Adult   Pulse: 98   SpO2: 98%   Weight: 90.7 kg (200 lb)   Height: 1.88 m (6' 2\")             Physical Exam  Vitals reviewed.   Constitutional:       Appearance: Normal appearance. He is normal weight.   HENT:      Head: Normocephalic and atraumatic.   Eyes:      Extraocular Movements: Extraocular movements intact.      Pupils: Pupils are equal, round, and reactive to light.   Cardiovascular:      Rate and Rhythm: Normal rate and regular rhythm.      Pulses: Normal pulses.      Heart sounds: Normal heart sounds.   Pulmonary:      Effort: Pulmonary effort is normal.      Breath sounds: Normal breath sounds.   Abdominal:      Palpations: Abdomen is soft.   Musculoskeletal:         General: Normal range of motion.      Cervical back: Normal range of motion and neck supple.   Skin:     General: Skin is warm and dry.   Neurological:      General: No focal deficit present.      Mental Status: He is alert and oriented to person, place, and time. Mental status is at baseline.   Psychiatric:         Mood and Affect: Mood normal.         Behavior: Behavior normal.         Thought Content: Thought content normal.         Judgment: Judgment normal.         Lab Data:    CBC:   Lab Results   Component Value Date/Time    WBC 6.4 02/22/2024 11:02 AM    WBC 8.6 10/20/2023 10:59 AM    WBC 5.1 10/13/2023 07:00 AM    RBC 4.11 02/22/2024 11:02 AM    RBC 4.25 10/20/2023 10:59 AM    RBC 4.18 10/13/2023 07:00 AM    HGB 14.7 02/22/2024 11:02 AM    HGB 14.9 10/20/2023 10:59 AM    HGB 14.7 10/13/2023 07:00 AM    HCT 42.0 02/22/2024 11:02 AM    HCT 44.7 10/20/2023 10:59 AM    HCT 44.2 10/13/2023 07:00 AM    .0 02/22/2024 11:02 AM    .2 10/20/2023 10:59 AM    .8 10/13/2023 07:00 AM    RDW 13.7 02/22/2024 11:02 AM    RDW 13.5 10/20/2023 10:59 AM    RDW 13.8 10/13/2023 07:00 AM

## 2024-11-25 ENCOUNTER — OFFICE VISIT (OUTPATIENT)
Dept: CARDIOLOGY CLINIC | Age: 59
End: 2024-11-25
Payer: COMMERCIAL

## 2024-11-25 VITALS
OXYGEN SATURATION: 98 % | HEIGHT: 74 IN | SYSTOLIC BLOOD PRESSURE: 156 MMHG | HEART RATE: 98 BPM | DIASTOLIC BLOOD PRESSURE: 90 MMHG | WEIGHT: 200 LBS | BODY MASS INDEX: 25.67 KG/M2

## 2024-11-25 DIAGNOSIS — I42.6 ALCOHOLIC CARDIOMYOPATHY (HCC): ICD-10-CM

## 2024-11-25 DIAGNOSIS — I50.22 CHRONIC SYSTOLIC CONGESTIVE HEART FAILURE (HCC): Primary | ICD-10-CM

## 2024-11-25 DIAGNOSIS — I48.92 ATRIAL FLUTTER, UNSPECIFIED TYPE (HCC): ICD-10-CM

## 2024-11-25 DIAGNOSIS — I10 PRIMARY HYPERTENSION: ICD-10-CM

## 2024-11-25 DIAGNOSIS — I25.10 CORONARY ARTERY DISEASE INVOLVING NATIVE CORONARY ARTERY OF NATIVE HEART WITHOUT ANGINA PECTORIS: ICD-10-CM

## 2024-11-25 PROCEDURE — 3017F COLORECTAL CA SCREEN DOC REV: CPT | Performed by: NURSE PRACTITIONER

## 2024-11-25 PROCEDURE — 99214 OFFICE O/P EST MOD 30 MIN: CPT | Performed by: NURSE PRACTITIONER

## 2024-11-25 PROCEDURE — G8419 CALC BMI OUT NRM PARAM NOF/U: HCPCS | Performed by: NURSE PRACTITIONER

## 2024-11-25 PROCEDURE — G8427 DOCREV CUR MEDS BY ELIG CLIN: HCPCS | Performed by: NURSE PRACTITIONER

## 2024-11-25 PROCEDURE — 3080F DIAST BP >= 90 MM HG: CPT | Performed by: NURSE PRACTITIONER

## 2024-11-25 PROCEDURE — 1036F TOBACCO NON-USER: CPT | Performed by: NURSE PRACTITIONER

## 2024-11-25 PROCEDURE — 3077F SYST BP >= 140 MM HG: CPT | Performed by: NURSE PRACTITIONER

## 2024-11-25 PROCEDURE — G8484 FLU IMMUNIZE NO ADMIN: HCPCS | Performed by: NURSE PRACTITIONER

## 2024-11-25 RX ORDER — SILDENAFIL 50 MG/1
50 TABLET, FILM COATED ORAL PRN
Qty: 10 TABLET | Refills: 0 | Status: SHIPPED | OUTPATIENT
Start: 2024-11-25

## 2024-11-25 RX ORDER — CARVEDILOL 3.12 MG/1
3.12 TABLET ORAL 2 TIMES DAILY
Qty: 180 TABLET | Refills: 0 | Status: SHIPPED | OUTPATIENT
Start: 2024-11-25

## 2024-11-25 NOTE — PATIENT INSTRUCTIONS
Instructions:   Medications: start carvedilol 3.125mg twice a day, try viagra one or 1/2 pill 30min before sexual acitivity   Labs today  Lifestyle Recommendations: Weigh yourself every day in the morning after urination, call Marta if wt increases 2-3lb in one day or 5lb in one week, Limit sodium to 3000mg/day and fluids to 2L or 64oz/day.   Follow up: Marta in 4-6 weeks         [facility] Resource Line: [phone]

## 2025-01-17 DIAGNOSIS — J44.9 COPD, MODERATE (HCC): Primary | ICD-10-CM

## 2025-01-17 DIAGNOSIS — J43.2 CENTRILOBULAR EMPHYSEMA (HCC): ICD-10-CM

## 2025-01-17 RX ORDER — TIOTROPIUM BROMIDE AND OLODATEROL 3.124; 2.736 UG/1; UG/1
2 SPRAY, METERED RESPIRATORY (INHALATION) DAILY
Qty: 4 G | Refills: 5 | Status: SHIPPED | OUTPATIENT
Start: 2025-01-17

## 2025-01-17 RX ORDER — CLOPIDOGREL BISULFATE 75 MG/1
75 TABLET ORAL DAILY
Qty: 90 TABLET | Refills: 1 | Status: SHIPPED | OUTPATIENT
Start: 2025-01-17

## 2025-01-17 NOTE — TELEPHONE ENCOUNTER
Received refill request for CLOPIDOGREL 75MG TABLETS  from [facility] pharmacy.     Last OV: 1/13/25    Next OV: 1/23/25    Last Labs: cbc 2/22/24    Last Filled: 5/8/24

## 2025-01-24 NOTE — PROGRESS NOTES
monitoring         Past Medical History:   has a past medical history of Afib (HCC) and Hyperlipidemia.  Surgical History:   has no past surgical history on file.   Social History:   reports that he quit smoking about 2 years ago. His smoking use included cigarettes. He started smoking about 42 years ago. He has a 40 pack-year smoking history. He does not have any smokeless tobacco history on file. He reports current alcohol use.   Family History:   Family History   Problem Relation Age of Onset    Heart Disease Mother        HomeMedications:  Prior to Admission medications    Medication Sig Start Date End Date Taking? Authorizing Provider   clopidogrel (PLAVIX) 75 MG tablet TAKE 1 TABLET BY MOUTH DAILY 1/17/25   Olman Peña APRN - CNP   STIOLTO RESPIMAT 2.5-2.5 MCG/ACT AERS INHALE 2 PUFFS INTO THE LUNGS DAILY 1/17/25   Arthur Matson MD   sildenafil (VIAGRA) 50 MG tablet Take 1 tablet by mouth as needed for Erectile Dysfunction 11/25/24   Marta Sun APRN - CNP   carvedilol (COREG) 3.125 MG tablet Take 1 tablet by mouth 2 times daily 11/25/24   Marta Sun APRN - CNP   Budeson-Glycopyrrol-Formoterol (BREZTRI AEROSPHERE) 160-9-4.8 MCG/ACT AERO Inhale 2 puffs into the lungs 2 times daily 10/28/24   Arthur Matson MD   amLODIPine (NORVASC) 10 MG tablet Take 1 tablet by mouth daily 10/28/24   Tom Espitia MD   albuterol sulfate HFA (PROVENTIL;VENTOLIN;PROAIR) 108 (90 Base) MCG/ACT inhaler Inhale 2 puffs into the lungs every 6 hours as needed for Wheezing 10/10/24 10/10/25  Geronimo Ham MD   ELIQUIS 5 MG TABS tablet TAKE 1 TABLET BY MOUTH TWICE DAILY 8/12/24   Olman Peña APRN - CNP   valsartan (DIOVAN) 80 MG tablet TAKE 1 TABLET BY MOUTH DAILY 7/1/24   Olman Peña APRN - CNP        Allergies:  Patient has no known allergies.     ROS:   Review of Systems   Constitutional: Negative.    Respiratory: Negative.     Cardiovascular: Negative.    Gastrointestinal: Negative.

## 2025-01-27 ENCOUNTER — OFFICE VISIT (OUTPATIENT)
Dept: CARDIOLOGY CLINIC | Age: 60
End: 2025-01-27
Payer: COMMERCIAL

## 2025-01-27 ENCOUNTER — HOSPITAL ENCOUNTER (OUTPATIENT)
Age: 60
Discharge: HOME | End: 2025-01-27
Payer: COMMERCIAL

## 2025-01-27 VITALS
WEIGHT: 200 LBS | SYSTOLIC BLOOD PRESSURE: 118 MMHG | HEART RATE: 83 BPM | DIASTOLIC BLOOD PRESSURE: 82 MMHG | BODY MASS INDEX: 25.67 KG/M2 | OXYGEN SATURATION: 93 % | HEIGHT: 74 IN

## 2025-01-27 DIAGNOSIS — I42.6 ALCOHOLIC CARDIOMYOPATHY (HCC): Primary | ICD-10-CM

## 2025-01-27 DIAGNOSIS — I50.22 CHRONIC SYSTOLIC CONGESTIVE HEART FAILURE (HCC): ICD-10-CM

## 2025-01-27 DIAGNOSIS — I25.10 CORONARY ARTERY DISEASE INVOLVING NATIVE CORONARY ARTERY OF NATIVE HEART WITHOUT ANGINA PECTORIS: ICD-10-CM

## 2025-01-27 DIAGNOSIS — I10 PRIMARY HYPERTENSION: ICD-10-CM

## 2025-01-27 LAB
ANION GAP SERPL CALCULATED.3IONS-SCNC: 8 MMOL/L (ref 3–16)
BUN SERPL-MCNC: 14 MG/DL (ref 7–20)
CALCIUM SERPL-MCNC: 9.7 MG/DL (ref 8.3–10.6)
CHLORIDE SERPL-SCNC: 104 MMOL/L (ref 99–110)
CO2 SERPL-SCNC: 27 MMOL/L (ref 21–32)
CREAT SERPL-MCNC: 1.1 MG/DL (ref 0.8–1.3)
DEPRECATED RDW RBC AUTO: 13.4 % (ref 12.4–15.4)
GFR SERPLBLD CREATININE-BSD FMLA CKD-EPI: 76 ML/MIN/{1.73_M2}
GLUCOSE SERPL-MCNC: 91 MG/DL (ref 70–99)
HCT VFR BLD AUTO: 43.9 % (ref 40.5–52.5)
HGB BLD-MCNC: 14.9 G/DL (ref 13.5–17.5)
MCH RBC QN AUTO: 35.3 PG (ref 26–34)
MCHC RBC AUTO-ENTMCNC: 34 G/DL (ref 31–36)
MCV RBC AUTO: 103.9 FL (ref 80–100)
NT-PROBNP SERPL-MCNC: 1211 PG/ML (ref 0–124)
PLATELET # BLD AUTO: 203 K/UL (ref 135–450)
PMV BLD AUTO: 8.3 FL (ref 5–10.5)
POTASSIUM SERPL-SCNC: 4.3 MMOL/L (ref 3.5–5.1)
RBC # BLD AUTO: 4.22 M/UL (ref 4.2–5.9)
SODIUM SERPL-SCNC: 139 MMOL/L (ref 136–145)
WBC # BLD AUTO: 5.7 K/UL (ref 4–11)

## 2025-01-27 PROCEDURE — 1036F TOBACCO NON-USER: CPT | Performed by: NURSE PRACTITIONER

## 2025-01-27 PROCEDURE — G8427 DOCREV CUR MEDS BY ELIG CLIN: HCPCS | Performed by: NURSE PRACTITIONER

## 2025-01-27 PROCEDURE — 99214 OFFICE O/P EST MOD 30 MIN: CPT | Performed by: NURSE PRACTITIONER

## 2025-01-27 PROCEDURE — 36415 COLL VENOUS BLD VENIPUNCTURE: CPT

## 2025-01-27 PROCEDURE — 3079F DIAST BP 80-89 MM HG: CPT | Performed by: NURSE PRACTITIONER

## 2025-01-27 PROCEDURE — 80048 BASIC METABOLIC PNL TOTAL CA: CPT

## 2025-01-27 PROCEDURE — 3074F SYST BP LT 130 MM HG: CPT | Performed by: NURSE PRACTITIONER

## 2025-01-27 PROCEDURE — G8419 CALC BMI OUT NRM PARAM NOF/U: HCPCS | Performed by: NURSE PRACTITIONER

## 2025-01-27 PROCEDURE — 85027 COMPLETE CBC AUTOMATED: CPT

## 2025-01-27 PROCEDURE — 83880 ASSAY OF NATRIURETIC PEPTIDE: CPT

## 2025-01-27 PROCEDURE — 3017F COLORECTAL CA SCREEN DOC REV: CPT | Performed by: NURSE PRACTITIONER

## 2025-01-27 RX ORDER — AMLODIPINE BESYLATE 5 MG/1
5 TABLET ORAL DAILY
Qty: 90 TABLET | Refills: 1 | Status: SHIPPED | OUTPATIENT
Start: 2025-01-27

## 2025-01-27 NOTE — PATIENT INSTRUCTIONS
Instructions:   Medications: decrease amlodipine to 5mg (1/2 pill) once a day, continue other meds  Labs today  Lifestyle Recommendations: Weigh yourself every day in the morning after urination, call Marta if wt increases 2-3lb in one day or 5lb in one week, Limit sodium to 3000mg/day and fluids to 2L or 64oz/day.   Follow up: Marta in 3-4months        [place] [facility]: [phone]  
MDD

## 2025-01-28 ENCOUNTER — TELEPHONE (OUTPATIENT)
Dept: CARDIOLOGY CLINIC | Age: 60
End: 2025-01-28

## 2025-01-28 NOTE — TELEPHONE ENCOUNTER
----- Message from LUCY Zambrano CNP sent at 1/28/2025  8:29 AM EST -----  Labs look great, no new orders. KJGERALD

## 2025-03-19 DIAGNOSIS — R55 SYNCOPE, UNSPECIFIED SYNCOPE TYPE: ICD-10-CM

## 2025-03-19 DIAGNOSIS — R06.02 SOB (SHORTNESS OF BREATH): ICD-10-CM

## 2025-03-19 DIAGNOSIS — I48.92 ATRIAL FLUTTER, UNSPECIFIED TYPE (HCC): ICD-10-CM

## 2025-03-19 DIAGNOSIS — Z87.891 HX OF SMOKING: ICD-10-CM

## 2025-03-19 DIAGNOSIS — R07.9 CHEST PAIN, UNSPECIFIED TYPE: ICD-10-CM

## 2025-03-19 DIAGNOSIS — L81.9 DISCOLORATION OF SKIN OF LOWER LEG: ICD-10-CM

## 2025-03-19 RX ORDER — VALSARTAN 80 MG/1
80 TABLET ORAL DAILY
Qty: 90 TABLET | Refills: 1 | Status: SHIPPED | OUTPATIENT
Start: 2025-03-19

## 2025-03-19 NOTE — TELEPHONE ENCOUNTER
Received refill request for Valsartan from [facility] pharmacy.    Last ov: 07/22/2024 NPSR    Last Refill: 07/01/2024 #90 w/ 1    Next appointment: none

## 2025-04-10 ENCOUNTER — TELEPHONE (OUTPATIENT)
Dept: CARDIOLOGY CLINIC | Age: 60
End: 2025-04-10

## 2025-04-10 DIAGNOSIS — R55 SYNCOPE, UNSPECIFIED SYNCOPE TYPE: ICD-10-CM

## 2025-04-10 DIAGNOSIS — R06.02 SOB (SHORTNESS OF BREATH): ICD-10-CM

## 2025-04-10 DIAGNOSIS — L81.9 DISCOLORATION OF SKIN OF LOWER LEG: ICD-10-CM

## 2025-04-10 DIAGNOSIS — R07.9 CHEST PAIN, UNSPECIFIED TYPE: ICD-10-CM

## 2025-04-10 DIAGNOSIS — Z87.891 HX OF SMOKING: ICD-10-CM

## 2025-04-10 DIAGNOSIS — I48.92 ATRIAL FLUTTER, UNSPECIFIED TYPE (HCC): ICD-10-CM

## 2025-04-10 RX ORDER — VALSARTAN 80 MG/1
80 TABLET ORAL DAILY
Qty: 90 TABLET | Refills: 1 | Status: SHIPPED | OUTPATIENT
Start: 2025-04-10

## 2025-04-10 NOTE — TELEPHONE ENCOUNTER
Pt states he has bottles of medication from different providers and is asking for a call to discuss which mediation he should be currently taking. Please call pt to discuss.

## 2025-04-10 NOTE — TELEPHONE ENCOUNTER
Called and spoke the pt and his question is if he should be taking the amlodipine and the carvedilol together?    Pt states he had been taking How is he take the amlodipine? PT states he has been taking not stopped taking metoprolol, pt informed it is not on his med list and it looked as if metoprolol was d/c.    Pt also stated that he was still taking his amlodipine at 10mg, I informed pt taht according to his last visit with NPK[facility] he was to decrease his amlodipine to 5mg a day. Pt verbalized understanding.

## 2025-04-10 NOTE — TELEPHONE ENCOUNTER
Pt states he is out of valsartan and has been out for about 2 weeks. It looks like a refill was sent to [facility] on 03/19/2025, but was also discontinued on the same date.     Does pt need valsartan if so he needs a refill to be sent in.         Start Date: 07/01/24 End Date: 03/19/25  Discontinued by: Malina Judd APRN - GERONIMO on 3/19/2025 10:41

## 2025-04-25 NOTE — PROGRESS NOTES
[facility]   Congestive Heart Failure    PrimaryCare Doctor:  None, None  Primary Cardiologist: [place]      Chief Complaint:  sob/chest pain    History of Present Illness  The patient is a 59-year-old male with PMH AFl, CAD, PCI, ICM, HFimpEF, alcohol and nicotine abuse, marijuanawho presents for follow-up.    He reports experiencing piercing chest cramps on three occasions over the past month, with the most recent episode occurring last Thursday. These cramps are described as constant, fluctuating in intensity from light to heavy, and are accompanied by a shooting, piercing sensation radiating up through his chest. This symptom has not been present since his stent placement. He also reports a decrease in his energy levels, with heavy breathing occurring after approximately 4 hours of exertion, necessitating rest. He has lost 5 pounds.  He is not taking a statin - is not sure why.     He has experienced a few fainting episodes, which he finds distressing. He has researched cough syncope online and believes it may be a contributing factor.    EKG today: Afl with RVR rate 130 - he is taking eliquis, no missed doses        ER Visit: No  Recent Hospitalization: No    Baseline Weight: 180lb?  Wt Readings from Last 3 Encounters:   04/28/25 88.5 kg (195 lb)   01/27/25 90.7 kg (200 lb)   11/25/24 90.7 kg (200 lb)          EF: 50-55%  Cardiac Imaging:Echo 9/18/24:     Left Ventricle: Normal left ventricular systolic function with a visually estimated EF of 50 - 55%. Left ventricle size is normal. Normal wall thickness. Normal wall motion. Indeterminate diastolic function.    Right Ventricle: Right ventricle size is normal. Normal systolic function.  Echo 7/13/23   Summary   -Left ventricular cavity size is normal with normal left ventricular wall   thickness.   -Overall left ventricular systolic function is difficult to well evaluate   secondary to irregular heart rate, appears mildly reduced.   -Ejection

## 2025-04-28 ENCOUNTER — OFFICE VISIT (OUTPATIENT)
Dept: CARDIOLOGY CLINIC | Age: 60
End: 2025-04-28
Payer: COMMERCIAL

## 2025-04-28 VITALS
WEIGHT: 195 LBS | SYSTOLIC BLOOD PRESSURE: 128 MMHG | DIASTOLIC BLOOD PRESSURE: 78 MMHG | HEIGHT: 74 IN | HEART RATE: 77 BPM | OXYGEN SATURATION: 97 % | BODY MASS INDEX: 25.03 KG/M2

## 2025-04-28 DIAGNOSIS — I25.118 CORONARY ARTERY DISEASE OF NATIVE ARTERY OF NATIVE HEART WITH STABLE ANGINA PECTORIS: ICD-10-CM

## 2025-04-28 DIAGNOSIS — I50.22 CHRONIC SYSTOLIC CONGESTIVE HEART FAILURE (HCC): ICD-10-CM

## 2025-04-28 DIAGNOSIS — I48.19 PERSISTENT ATRIAL FIBRILLATION (HCC): ICD-10-CM

## 2025-04-28 DIAGNOSIS — I42.6 ALCOHOLIC CARDIOMYOPATHY (HCC): ICD-10-CM

## 2025-04-28 DIAGNOSIS — I20.9 ANGINA PECTORIS: Primary | ICD-10-CM

## 2025-04-28 PROCEDURE — 3074F SYST BP LT 130 MM HG: CPT | Performed by: NURSE PRACTITIONER

## 2025-04-28 PROCEDURE — G8427 DOCREV CUR MEDS BY ELIG CLIN: HCPCS | Performed by: NURSE PRACTITIONER

## 2025-04-28 PROCEDURE — G8419 CALC BMI OUT NRM PARAM NOF/U: HCPCS | Performed by: NURSE PRACTITIONER

## 2025-04-28 PROCEDURE — 99215 OFFICE O/P EST HI 40 MIN: CPT | Performed by: NURSE PRACTITIONER

## 2025-04-28 PROCEDURE — 3017F COLORECTAL CA SCREEN DOC REV: CPT | Performed by: NURSE PRACTITIONER

## 2025-04-28 PROCEDURE — 3078F DIAST BP <80 MM HG: CPT | Performed by: NURSE PRACTITIONER

## 2025-04-28 PROCEDURE — 1036F TOBACCO NON-USER: CPT | Performed by: NURSE PRACTITIONER

## 2025-04-28 PROCEDURE — 93000 ELECTROCARDIOGRAM COMPLETE: CPT | Performed by: NURSE PRACTITIONER

## 2025-04-28 RX ORDER — ATORVASTATIN CALCIUM 40 MG/1
40 TABLET, FILM COATED ORAL DAILY
Qty: 90 TABLET | Refills: 3 | Status: SHIPPED | OUTPATIENT
Start: 2025-04-28

## 2025-04-28 ASSESSMENT — ENCOUNTER SYMPTOMS: SHORTNESS OF BREATH: 1

## 2025-04-29 NOTE — PROGRESS NOTES
Procedure: DCCV    Date Of Procedure:  4/30/25    Time Of Arrival: 12PM    Procedure Time: 1PM       Called and spoke to patient and he is agreeable to the date and time. Reviewed the Pre-Procedure instructions and they verbalized understanding. Encouraged to call with any questions or concerns.       Published on [employer] / emailed to Cath lab / note in chart / scheduled in Epic/Cupid/Carto

## 2025-04-30 ENCOUNTER — TELEPHONE (OUTPATIENT)
Dept: CARDIOLOGY CLINIC | Age: 60
End: 2025-04-30

## 2025-04-30 ENCOUNTER — HOSPITAL ENCOUNTER (OUTPATIENT)
Age: 60
Discharge: HOME | End: 2025-05-02

## 2025-04-30 VITALS
SYSTOLIC BLOOD PRESSURE: 136 MMHG | TEMPERATURE: 98.1 F | OXYGEN SATURATION: 100 % | DIASTOLIC BLOOD PRESSURE: 96 MMHG | HEART RATE: 94 BPM | RESPIRATION RATE: 16 BRPM

## 2025-04-30 DIAGNOSIS — I48.0 PAROXYSMAL ATRIAL FIBRILLATION (HCC): Primary | ICD-10-CM

## 2025-04-30 DIAGNOSIS — I48.19 PERSISTENT ATRIAL FIBRILLATION (HCC): ICD-10-CM

## 2025-04-30 NOTE — PROGRESS NOTES
Patient has not taken his Eliquis consistently.  He has come to take Eliquis twice daily without missing a dose and will reschedule for 3 weeks

## 2025-04-30 NOTE — PROGRESS NOTES
Procedure cancelled per Dr. Mendoza due to pt not knowing how many doses of eliquis he's missed.

## 2025-04-30 NOTE — TELEPHONE ENCOUNTER
You are scheduled for a FACUNDO and/or cardioversion.    Our  will call you to discuss a date for your procedure.    The day of your procedure you will need to check in at the Registration Desk in the Main Lobby.    PRE-PROCEDURE INSTRUCTIONS   Do not eat or drink anything after midnight the night before your procedure.   Take all your medications with a sip of water in the morning.   Do not hold your Eliquis   Please have a responsible adult to drive you home after your procedure.    If you have any questions regarding your procedure itself or your medications, please call [phone] and ask to speak to an EP nurse.

## 2025-04-30 NOTE — H&P
daily Yes Arthur Matson MD      Past Medical History:  Past Medical History:   Diagnosis Date    Afib (HCC)     Hyperlipidemia      Past Surgical History:    has no past surgical history on file.     Social History:  Personally Reviewed.  reports that he quit smoking about 18 months ago. His smoking use included cigarettes. He started smoking about 41 years ago. He has a 40.0 pack-year smoking history. He does not have any smokeless tobacco history on file. He reports current alcohol use.     Family History:  Personally Reviewed. family history includes Heart Disease in his mother.     Review of Systems:  Constitutional: Negative for fever, night sweats, chills, weight changes, or weakness  Skin: Negative for rash, dry skin, pruritus, bruising, bleeding, blood clots, or changes in skin pigment  HEENT: Negative for vision changes, ringing in the ears, sore throat, dysphagia, or swollen lymph nodes  Respiratory: Reviewed in HPI  Cardiovascular: Reviewed in HPI  Gastrointestinal: Negative for abdominal pain, N/V/D, constipation, or black/tarry stools  Genito-Urinary: Negative for dysuria, incontinence, urgency, or hematuria  Musculoskeletal: Negative for joint swelling, muscle pain, or injuries  Neurological/Psych: Negative for confusion, seizures, dizziness, headaches, balance issues or TIA-like symptoms. No anxiety, depression, or insomnia    Physical Examination:  Vitals:    07/22/24 1209   BP: 134/80   Pulse: 70   SpO2:         Wt Readings from Last 3 Encounters:   07/22/24 86.2 kg (190 lb)   12/11/23 86.6 kg (191 lb)   10/25/23 83 kg (183 lb)     Constitutional: Cooperative and in no apparent distress, and appears well nourished  Skin: Warm and pink; no pallor, cyanosis, bruising, or clubbing  HEENT: Symmetric and normocephalic. PERRL, EOM intact. Conjunctiva pink with clear sclera. Mucus membranes pink and moist. Teeth intact. Thyroid smooth without nodules or goiter  Respiratory: Respirations symmetric and

## 2025-05-01 LAB
EKG DIAGNOSIS: NORMAL
EKG Q-T INTERVAL: 354 MS
EKG QRS DURATION: 96 MS
EKG QTC CALCULATION (BAZETT): 442 MS
EKG R AXIS: 74 DEGREES
EKG T AXIS: 60 DEGREES
EKG VENTRICULAR RATE: 94 BPM

## 2025-05-02 NOTE — TELEPHONE ENCOUNTER
Procedure: DCCV    Date Of Procedure:  5/22/25    Time Of Arrival: 1PM    Procedure Time: 2PM       Called and spoke to patient and he is agreeable to the date and time. Reviewed the Pre-Procedure instructions and they verbalized understanding. Encouraged to call with any questions or concerns.       Published on [employer] / emailed to Cath lab / note in chart / scheduled in Epic/Cupid/Carto

## 2025-05-12 ENCOUNTER — OFFICE VISIT (OUTPATIENT)
Dept: PULMONOLOGY | Age: 60
End: 2025-05-12
Payer: COMMERCIAL

## 2025-05-12 VITALS
DIASTOLIC BLOOD PRESSURE: 80 MMHG | WEIGHT: 194.2 LBS | HEIGHT: 74 IN | SYSTOLIC BLOOD PRESSURE: 134 MMHG | BODY MASS INDEX: 24.92 KG/M2 | HEART RATE: 67 BPM | OXYGEN SATURATION: 99 %

## 2025-05-12 DIAGNOSIS — J44.9 COPD, MODERATE (HCC): ICD-10-CM

## 2025-05-12 DIAGNOSIS — Z87.891 PERSONAL HISTORY OF TOBACCO USE: Primary | ICD-10-CM

## 2025-05-12 DIAGNOSIS — I50.22 CHRONIC SYSTOLIC CONGESTIVE HEART FAILURE (HCC): ICD-10-CM

## 2025-05-12 DIAGNOSIS — J96.11 CHRONIC HYPOXEMIC RESPIRATORY FAILURE (HCC): ICD-10-CM

## 2025-05-12 DIAGNOSIS — J43.2 CENTRILOBULAR EMPHYSEMA (HCC): ICD-10-CM

## 2025-05-12 PROCEDURE — G0296 VISIT TO DETERM LDCT ELIG: HCPCS | Performed by: INTERNAL MEDICINE

## 2025-05-12 PROCEDURE — 3017F COLORECTAL CA SCREEN DOC REV: CPT | Performed by: INTERNAL MEDICINE

## 2025-05-12 PROCEDURE — 1036F TOBACCO NON-USER: CPT | Performed by: INTERNAL MEDICINE

## 2025-05-12 PROCEDURE — 3075F SYST BP GE 130 - 139MM HG: CPT | Performed by: INTERNAL MEDICINE

## 2025-05-12 PROCEDURE — 99214 OFFICE O/P EST MOD 30 MIN: CPT | Performed by: INTERNAL MEDICINE

## 2025-05-12 PROCEDURE — 3079F DIAST BP 80-89 MM HG: CPT | Performed by: INTERNAL MEDICINE

## 2025-05-12 PROCEDURE — G8427 DOCREV CUR MEDS BY ELIG CLIN: HCPCS | Performed by: INTERNAL MEDICINE

## 2025-05-12 PROCEDURE — 3023F SPIROM DOC REV: CPT | Performed by: INTERNAL MEDICINE

## 2025-05-12 PROCEDURE — G8420 CALC BMI NORM PARAMETERS: HCPCS | Performed by: INTERNAL MEDICINE

## 2025-05-12 NOTE — PROGRESS NOTES
[facility]  Consult Note  MD Maikel Harris Shannan   YOB: 1965    Date of Visit:  5/12/2025    Assessment/Plan:  1. COPD, moderate (HCC)  PFT 9/8/2023:  Spirometry:   Flow volume loops were obstructed. The FEV-1/FVC ratio was   decreased. The  post-bronchodilator FEV-1 was 3.14 liters (76% of   predicted), which was moderately decreased. The FVC was 5.07   liters (93% of predicted), which was decreased. Response to   inhaled bronchodilators (albuterol) was significant.     Lung volumes:   Lung volumes were tested by plethysmography. The total lung   capacity was 9.73 liters (128% of predicted), which was   increased. The residual volume was 5.58 liters (211% of   predicted), which was increased. The ratio of residual volume to   total lung capacity (RV/TLC) was 154, which was increased.   Specific airway resistance was increased.     Diffusion capacity was found to be decreased.       Interpretation:   Moderate obstructive airway disease with hyperinflation and   reduced diffusion capacity.    Patient is short of breath with minimal exertion.  I reviewed pulmonary function testing and my impression is moderate to severe obstructive lung disease with hyperinflation and air trapping.    Medication management:  Start Trelegy 100, once daily ==> Not on his formulary  Breztri ==> has really helped him but he was only using it once per day  Have him use the Breztri BID  Use a spacer with this  Albuterol as needed and in advance of activity    Repeat PFT    2. Centrilobular emphysema (HCC)/former smoker  CT Chest 10/23:  FINDINGS:  Mediastinum:  Thoracic aorta normal in course and caliber.  No enlarged  mediastinal or hilar lymph nodes by imaging size criteria.  Heart size within  normal limits.  Coronary artery calcification present.     Lungs/Pleura:  Stable emphysema.  No consolidation, pneumothorax or pleural  effusion.  Central airways are

## 2025-05-22 ENCOUNTER — HOSPITAL ENCOUNTER (OUTPATIENT)
Age: 60
Discharge: HOME | End: 2025-05-24
Payer: COMMERCIAL

## 2025-05-22 VITALS
SYSTOLIC BLOOD PRESSURE: 130 MMHG | WEIGHT: 195 LBS | RESPIRATION RATE: 13 BRPM | DIASTOLIC BLOOD PRESSURE: 74 MMHG | BODY MASS INDEX: 25.03 KG/M2 | HEART RATE: 64 BPM | HEIGHT: 74 IN | OXYGEN SATURATION: 100 %

## 2025-05-22 LAB
EKG ATRIAL RATE: 91 BPM
EKG DIAGNOSIS: NORMAL
EKG DIAGNOSIS: NORMAL
EKG P AXIS: 76 DEGREES
EKG P-R INTERVAL: 148 MS
EKG Q-T INTERVAL: 344 MS
EKG Q-T INTERVAL: 350 MS
EKG QRS DURATION: 96 MS
EKG QRS DURATION: 98 MS
EKG QTC CALCULATION (BAZETT): 409 MS
EKG QTC CALCULATION (BAZETT): 430 MS
EKG R AXIS: 83 DEGREES
EKG R AXIS: 84 DEGREES
EKG T AXIS: 58 DEGREES
EKG T AXIS: 68 DEGREES
EKG VENTRICULAR RATE: 85 BPM
EKG VENTRICULAR RATE: 91 BPM

## 2025-05-22 PROCEDURE — 7100000011 HC PHASE II RECOVERY - ADDTL 15 MIN: Performed by: INTERNAL MEDICINE

## 2025-05-22 PROCEDURE — 2500000003 HC RX 250 WO HCPCS: Performed by: INTERNAL MEDICINE

## 2025-05-22 PROCEDURE — 93005 ELECTROCARDIOGRAM TRACING: CPT

## 2025-05-22 PROCEDURE — 7100000010 HC PHASE II RECOVERY - FIRST 15 MIN: Performed by: INTERNAL MEDICINE

## 2025-05-22 RX ADMIN — METHOHEXITAL SODIUM 60 MG: 500 INJECTION, POWDER, LYOPHILIZED, FOR SOLUTION INTRAMUSCULAR; INTRAVENOUS; RECTAL at 10:59

## 2025-05-22 NOTE — H&P
to be 35-40%. There is diffuse hypokinesis.   -Indeterminate diastolic function due to irregular heart rhythm.   -Trivial mitral regurgitation. Mild tricuspid regurgitation.   -Estimated pulmonary artery systolic pressure is normal at 28 mmHg assuming a right atrial pressure of 3 mmHg. The left atrium is mildly dilated.   -Right ventricular borderline function. TAPSE 1.4 cm.RV S Velocity 9.86 cm/s.    GXT: 9/23   Mild global hypokinesis with EF 52   Small-medium sized inferior apical minimally reversible defect of moderate intensity consistent with infarction in the territory of the mid and distal LCx and/or RCA .     Cath: 9/23     Hemodynamics  LVEDP 9 mmHg   Left Main  No angiographically significant disease   LAD  Minor luminal irregularities  D1: No angiographically significant disease   Circ  No angiographically significant disease  OM1: No angiographically significant disease   RCA  Proximal vessel ulcerated 80% stenosis, large dominant vessel  rPDA: No angiographically significant disease         Interventions/Vessels  IVUS guided PCI of the RCA     - IVUS performed distal vessel with positive remodeling 4.5mm proximal vessel reference 4.2mm, plaque does not required calcium modification, fibrofatty plaque with necrotic core  - Xience 4.0x18mm SHAQUILLE deployed at 17 george  - Post dilation performed with a 4.0mm NC balloon to 19 george  - Post IVUS performed with no edge dissections and excellent apposition with full expansion, MSA 9.08 mm2      Guides/Wires  JR4 Guide   Terumo RunThrough   Devices  [place] Opticross   Post % Stenosis  0   Closure Device  Radial hemostasis band   Complications  None       Assessment:    1.Persistent Atrial Fibrillation/Flutter   - S/p DCC (9/23)  - Currently in Atrial fibrillation   - Continue Toprol XL (will reduce to 25 mg daily given complaints of ED and sexual dysfunction since starting)    - [id number] score:2; [id number] Vasc score and anticoagulation discussed. High risk for

## 2025-05-22 NOTE — PROCEDURES
[facility]     Electrophysiology Procedure Note       Date of Procedure: 5/22/2025  Patient's Name: Maikel Campbell  YOB: 1965   Medical Record Number: [id number]  Procedure Performed by: Reg Mendoza MD    Procedures performed:  IV sedation.     External Electrical cardioversion   [id number]  ASA 3    Indication of the procedure: Persistent atrial fibrillation      Details of procedure:   The patient was brought to the cath lab area in a fasting and non-sedated state. The risks, benefits and alternatives of the procedure were discussed with the patient. The patient opted to proceed with the procedure. Written informed consent was signed and placed in the chart.  A timeout protocol was completed to identify the patient and the procedure being performed.        I pushed 60 mg Brevital.An independent trained observer pushed medications and/or monitored patient  at my direction.   We monitored the patient's level of consciousness and vital signs/physiologic status throughout the procedure duration (see start and stop times below).  Sedation  Sedation start: 1057  Sedation stop: 1110     Patient is on chronic anticoagulation therapy.   Then we used Brevital for sedation and electrical DC cardioversion was perfomred using 200J, synchronized shock. Patient was converted to sinus rhythm at 91 bpm. The patient tolerated the procedure well and there were no complications.     Conclusion:   Successful external DC cardioversion of atrial fibrillation     Plan:   The patient can be discharged if remains stable. Will continue with medical therapy.

## 2025-05-27 ENCOUNTER — HOSPITAL ENCOUNTER (OUTPATIENT)
Dept: PULMONOLOGY | Age: 60
Discharge: HOME | End: 2025-05-27
Attending: INTERNAL MEDICINE
Payer: COMMERCIAL

## 2025-05-27 DIAGNOSIS — J44.9 COPD, MODERATE (HCC): ICD-10-CM

## 2025-05-27 DIAGNOSIS — J43.2 CENTRILOBULAR EMPHYSEMA (HCC): ICD-10-CM

## 2025-05-27 LAB
DLCO %PRED: 57 %
DLCO PRED: NORMAL
DLCO/VA %PRED: NORMAL
DLCO/VA PRED: NORMAL
DLCO/VA: NORMAL
DLCO: NORMAL
EXPIRATORY TIME-POST: NORMAL
EXPIRATORY TIME: NORMAL
FEF 25-75 %CHNG: NORMAL
FEF 25-75 POST %PRED: NORMAL
FEF 25-75% %PRED-PRE: NORMAL
FEF 25-75% PRED: NORMAL
FEF 25-75-POST: NORMAL
FEF 25-75-PRE: NORMAL
FEV1 %PRED-POST: 59 %
FEV1 %PRED-PRE: 52 %
FEV1 PRED: NORMAL
FEV1-POST: NORMAL
FEV1-PRE: NORMAL
FEV1/FVC %PRED-POST: NORMAL
FEV1/FVC %PRED-PRE: NORMAL
FEV1/FVC PRED: NORMAL
FEV1/FVC-POST: 82 %
FEV1/FVC-PRE: 81 %
FVC %PRED-POST: NORMAL
FVC %PRED-PRE: NORMAL
FVC PRED: NORMAL
FVC-POST: NORMAL
FVC-PRE: NORMAL
GAW %PRED: NORMAL
GAW PRED: NORMAL
GAW: NORMAL
IC PRE %PRED: NORMAL
IC PRED: NORMAL
IC: NORMAL
MEP: NORMAL
MIP: NORMAL
MVV %PRED-PRE: NORMAL
MVV PRED: NORMAL
MVV-PRE: NORMAL
PEF %PRED-POST: NORMAL
PEF %PRED-PRE: NORMAL
PEF PRED: NORMAL
PEF%CHNG: NORMAL
PEF-POST: NORMAL
PEF-PRE: NORMAL
RAW %PRED: NORMAL
RAW PRED: NORMAL
RAW: NORMAL
RV PRE %PRED: NORMAL
RV PRED: NORMAL
RV: NORMAL
SVC %PRED: NORMAL
SVC PRED: NORMAL
SVC: NORMAL
TLC PRE %PRED: 116 %
TLC PRED: NORMAL
TLC: NORMAL
VA %PRED: NORMAL
VA PRED: NORMAL
VA: NORMAL
VTG %PRED: NORMAL
VTG PRED: NORMAL
VTG: NORMAL

## 2025-05-27 PROCEDURE — 94729 DIFFUSING CAPACITY: CPT

## 2025-05-27 PROCEDURE — 94760 N-INVAS EAR/PLS OXIMETRY 1: CPT

## 2025-05-27 PROCEDURE — 94726 PLETHYSMOGRAPHY LUNG VOLUMES: CPT

## 2025-05-27 PROCEDURE — 6370000000 HC RX 637 (ALT 250 FOR IP): Performed by: INTERNAL MEDICINE

## 2025-05-27 PROCEDURE — 94060 EVALUATION OF WHEEZING: CPT

## 2025-05-27 RX ORDER — ALBUTEROL SULFATE 90 UG/1
4 INHALANT RESPIRATORY (INHALATION) EVERY 6 HOURS PRN
Status: DISCONTINUED | OUTPATIENT
Start: 2025-05-27 | End: 2025-05-28 | Stop reason: HOSPADM

## 2025-05-27 RX ADMIN — Medication 4 PUFF: at 16:21

## 2025-05-27 ASSESSMENT — PULMONARY FUNCTION TESTS
FEV1/FVC_POST: 82
FEV1_PERCENT_PREDICTED_PRE: 52
FEV1_PERCENT_PREDICTED_POST: 59
FEV1/FVC_PRE: 81

## 2025-05-28 ENCOUNTER — TELEPHONE (OUTPATIENT)
Dept: PULMONOLOGY | Age: 60
End: 2025-05-28

## 2025-05-28 NOTE — PROCEDURES
Pulmonary Function Testing      Patient name:  Maikel Campbell      Unit #:   [id number]   Date of test:  5/27/2025   Date of interpretation:   5/28/2025    Mr. Maikel Campbell is a 60 y.o. year-old male. The spirometry data were acceptable and reproducible.     Spirometry:  Flow volume loops were obstructed. The FEV-1/FVC ratio was decreased. The pre-bronchodilator FEV-1 was 2 liters (-2.9 Z score), which was moderately decreased. The FVC was 3.2 liters (-2.37 Z score), which was decreased. Response to inhaled bronchodilators (albuterol) was significant.    Lung volumes:  Lung volumes were tested by plethysmography. The total lung capacity was 9.07 liters (1.59 Z score), which was normal. The residual volume was 5.56 liters (8.16 Z score), which was increased. The ratio of residual volume to total lung capacity (RV/TLC) was 6.4 Z score, which was increased.     Diffusion capacity was found to be -2.97 Z score which is moderately decreased.      Interpretation:  Moderate obstructive ventilatory defect with significant improvement after inhaled bronchodilators.  There is also moderate reduction in diffusion capacity and air trapping is present    Comments:  Z score  Mild -1.645 to -2.5  Moderate -2.5 to -4.0  Severe: < -4.0     Using Z-scores can reduce age and height bias, and the recommended thresholds correlate with mortality risk.    Arthur Matson MD

## 2025-05-28 NOTE — TELEPHONE ENCOUNTER
Patient said he is ready to have the overnight oximetry done.  He wanted to wait until he had his heart shocked before the overnight oximetry.

## 2025-06-05 DIAGNOSIS — J44.9 COPD, MODERATE (HCC): ICD-10-CM

## 2025-06-05 DIAGNOSIS — J43.2 CENTRILOBULAR EMPHYSEMA (HCC): ICD-10-CM

## 2025-06-05 RX ORDER — CARVEDILOL 3.12 MG/1
3.12 TABLET ORAL 2 TIMES DAILY
Qty: 180 TABLET | Refills: 3 | Status: SHIPPED | OUTPATIENT
Start: 2025-06-05

## 2025-06-06 RX ORDER — BUDESONIDE, GLYCOPYRROLATE, AND FORMOTEROL FUMARATE 160; 9; 4.8 UG/1; UG/1; UG/1
2 AEROSOL, METERED RESPIRATORY (INHALATION) 2 TIMES DAILY
Qty: 10.7 G | Refills: 5 | Status: SHIPPED | OUTPATIENT
Start: 2025-06-06

## 2025-06-10 ENCOUNTER — TELEPHONE (OUTPATIENT)
Dept: ADMINISTRATIVE | Age: 60
End: 2025-06-10

## 2025-06-10 NOTE — TELEPHONE ENCOUNTER
Submitted PA for [employer] Aerosphere 160-9-4.8MCG/ACT aerosol   Via CMM Key: [id number]  STATUS: PENDING.    Follow up done daily; if no decision with in three days we will refax.  If another three days goes by with no decision will call the insurance for status.

## 2025-06-11 ENCOUNTER — TELEPHONE (OUTPATIENT)
Dept: PULMONOLOGY | Age: 60
End: 2025-06-11

## 2025-06-11 NOTE — TELEPHONE ENCOUNTER
The medication BREZTRI is APPROVED from 6/11/25 to 6/10/26.    Please notify the patient.    If this requires a response please respond to the pool ( P MHCX P[facility] MEDICATION PRE-AUTH).

## 2025-06-11 NOTE — TELEPHONE ENCOUNTER
Pt called and said that pharmacy said that Luis Daniel needs a PA, he has been waiting for this for over a week.    He wants to know the status

## 2025-07-02 DIAGNOSIS — J44.9 COPD, MODERATE (HCC): ICD-10-CM

## 2025-07-02 RX ORDER — ALBUTEROL SULFATE 90 UG/1
2 INHALANT RESPIRATORY (INHALATION) EVERY 6 HOURS PRN
Qty: 18 G | Refills: 3 | Status: SHIPPED | OUTPATIENT
Start: 2025-07-02 | End: 2026-07-02

## 2025-07-09 ENCOUNTER — TELEPHONE (OUTPATIENT)
Dept: PULMONOLOGY | Age: 60
End: 2025-07-09

## 2025-07-09 RX ORDER — CLOPIDOGREL BISULFATE 75 MG/1
75 TABLET ORAL DAILY
Qty: 90 TABLET | Refills: 1 | Status: SHIPPED | OUTPATIENT
Start: 2025-07-09

## 2025-07-09 NOTE — TELEPHONE ENCOUNTER
Called [facility] about pt Breztri. They stated that the most recent inhaler picked up was June 2025. It has 4 refills and it is fine to refill. She stated there was another rx for Breztri that was out of refills but that was d/c'd today. She said the message he got may have been about that one. She put in a refill request for the pt nd I notified him it was being refilled.

## 2025-07-09 NOTE — TELEPHONE ENCOUNTER
Received refill request for clopidogrel (PLAVIX) 75 MG tablet  from [facility] pharmacy.     Last OV: 4/28/25    Next OV: 10/7/25    Last Labs:     Last Filled: 1/71/25

## 2025-07-09 NOTE — TELEPHONE ENCOUNTER
Pt called stating there was problems picking up his  Budeson-Glycopyrrol-Formoterol (BREZTRI AEROSPHERE) 160-9-4.8 MCG/ACT AERO from the pharmacy again, he was told the pharmacy has to speak with us first no further details given

## 2025-08-27 ASSESSMENT — ENCOUNTER SYMPTOMS
GASTROINTESTINAL NEGATIVE: 1
SHORTNESS OF BREATH: 1

## 2025-08-28 ENCOUNTER — OFFICE VISIT (OUTPATIENT)
Dept: CARDIOLOGY CLINIC | Age: 60
End: 2025-08-28

## 2025-08-28 VITALS
HEIGHT: 74 IN | DIASTOLIC BLOOD PRESSURE: 78 MMHG | BODY MASS INDEX: 24.95 KG/M2 | SYSTOLIC BLOOD PRESSURE: 128 MMHG | OXYGEN SATURATION: 94 % | WEIGHT: 194.4 LBS | HEART RATE: 83 BPM

## 2025-08-28 DIAGNOSIS — I10 PRIMARY HYPERTENSION: ICD-10-CM

## 2025-08-28 DIAGNOSIS — I42.6 ALCOHOLIC CARDIOMYOPATHY (HCC): ICD-10-CM

## 2025-08-28 DIAGNOSIS — I48.92 ATRIAL FLUTTER, UNSPECIFIED TYPE (HCC): ICD-10-CM

## 2025-08-28 DIAGNOSIS — I25.10 CORONARY ARTERY DISEASE INVOLVING NATIVE CORONARY ARTERY OF NATIVE HEART WITHOUT ANGINA PECTORIS: ICD-10-CM

## 2025-08-28 DIAGNOSIS — I50.22 CHRONIC SYSTOLIC CONGESTIVE HEART FAILURE (HCC): Primary | ICD-10-CM

## 2025-08-28 ASSESSMENT — LIFESTYLE VARIABLES: HOW OFTEN DO YOU HAVE A DRINK CONTAINING ALCOHOL: 2-4 TIMES A MONTH
